# Patient Record
Sex: MALE | Race: WHITE | Employment: OTHER | URBAN - METROPOLITAN AREA
[De-identification: names, ages, dates, MRNs, and addresses within clinical notes are randomized per-mention and may not be internally consistent; named-entity substitution may affect disease eponyms.]

---

## 2017-05-30 ENCOUNTER — HOSPITAL ENCOUNTER (OUTPATIENT)
Dept: GENERAL RADIOLOGY | Age: 57
Discharge: HOME | End: 2017-05-30
Payer: COMMERCIAL

## 2017-05-30 DIAGNOSIS — M51.37 DEGENERATION OF LUMBAR OR LUMBOSACRAL INTERVERTEBRAL DISC: ICD-10-CM

## 2017-05-30 DIAGNOSIS — M51.36 DEGENERATION OF LUMBAR INTERVERTEBRAL DISC: ICD-10-CM

## 2017-05-30 PROCEDURE — 72050 X-RAY EXAM NECK SPINE 4/5VWS: CPT

## 2017-05-30 PROCEDURE — 72110 X-RAY EXAM L-2 SPINE 4/>VWS: CPT

## 2017-12-29 ENCOUNTER — HOSPITAL ENCOUNTER (OUTPATIENT)
Dept: MRI IMAGING | Age: 57
Discharge: HOME | End: 2017-12-29
Attending: PHYSICAL MEDICINE & REHABILITATION
Payer: COMMERCIAL

## 2017-12-29 ENCOUNTER — HOSPITAL ENCOUNTER (OUTPATIENT)
Dept: GENERAL RADIOLOGY | Age: 57
Discharge: HOME | End: 2017-12-29
Attending: PHYSICAL MEDICINE & REHABILITATION
Payer: COMMERCIAL

## 2017-12-29 DIAGNOSIS — M51.37 DDD (DEGENERATIVE DISC DISEASE), LUMBOSACRAL: ICD-10-CM

## 2017-12-29 DIAGNOSIS — M51.37 DEGENERATION OF LUMBAR OR LUMBOSACRAL INTERVERTEBRAL DISC: ICD-10-CM

## 2017-12-29 PROCEDURE — 72100 X-RAY EXAM L-S SPINE 2/3 VWS: CPT

## 2017-12-29 PROCEDURE — 72148 MRI LUMBAR SPINE W/O DYE: CPT

## 2018-01-11 ENCOUNTER — HOSPITAL ENCOUNTER (OUTPATIENT)
Dept: GENERAL RADIOLOGY | Age: 58
Discharge: HOME | End: 2018-01-11
Payer: COMMERCIAL

## 2018-01-11 DIAGNOSIS — M54.9 BACK PAIN: ICD-10-CM

## 2018-01-11 PROCEDURE — 72110 X-RAY EXAM L-2 SPINE 4/>VWS: CPT

## 2020-08-07 RX ORDER — CHLORTHALIDONE 25 MG/1
25 TABLET ORAL DAILY
COMMUNITY
End: 2022-05-31

## 2020-08-07 RX ORDER — METFORMIN HYDROCHLORIDE 500 MG/1
500 TABLET, EXTENDED RELEASE ORAL 2 TIMES DAILY
COMMUNITY

## 2020-08-07 RX ORDER — CYCLOBENZAPRINE HCL 10 MG
10-20 TABLET ORAL AS NEEDED
COMMUNITY

## 2020-08-07 RX ORDER — VALSARTAN 320 MG/1
320 TABLET ORAL DAILY
COMMUNITY
End: 2022-05-31

## 2020-08-07 RX ORDER — DULAGLUTIDE 1.5 MG/.5ML
1.5 INJECTION, SOLUTION SUBCUTANEOUS
COMMUNITY

## 2020-08-15 ENCOUNTER — HOSPITAL ENCOUNTER (OUTPATIENT)
Dept: PREADMISSION TESTING | Age: 60
Discharge: HOME | End: 2020-08-15
Payer: MEDICARE

## 2020-08-15 DIAGNOSIS — Z01.812 PRE-PROCEDURE LAB EXAM: ICD-10-CM

## 2020-08-15 PROCEDURE — 87635 SARS-COV-2 COVID-19 AMP PRB: CPT

## 2020-08-16 LAB — SARS-COV-2, COV2NT: NOT DETECTED

## 2020-08-19 ENCOUNTER — ANESTHESIA EVENT (OUTPATIENT)
Dept: ENDOSCOPY | Age: 60
End: 2020-08-19
Payer: MEDICARE

## 2020-08-19 ENCOUNTER — HOSPITAL ENCOUNTER (OUTPATIENT)
Age: 60
Setting detail: OUTPATIENT SURGERY
Discharge: HOME | End: 2020-08-19
Attending: INTERNAL MEDICINE | Admitting: INTERNAL MEDICINE
Payer: MEDICARE

## 2020-08-19 ENCOUNTER — ANESTHESIA (OUTPATIENT)
Dept: ENDOSCOPY | Age: 60
End: 2020-08-19
Payer: MEDICARE

## 2020-08-19 VITALS
TEMPERATURE: 97.7 F | RESPIRATION RATE: 11 BRPM | WEIGHT: 236 LBS | HEIGHT: 72 IN | SYSTOLIC BLOOD PRESSURE: 127 MMHG | HEART RATE: 65 BPM | BODY MASS INDEX: 31.97 KG/M2 | OXYGEN SATURATION: 99 % | DIASTOLIC BLOOD PRESSURE: 70 MMHG

## 2020-08-19 LAB
GLUCOSE BLD STRIP.AUTO-MCNC: 113 MG/DL (ref 65–100)
SERVICE CMNT-IMP: ABNORMAL

## 2020-08-19 PROCEDURE — 74011000250 HC RX REV CODE- 250: Performed by: NURSE ANESTHETIST, CERTIFIED REGISTERED

## 2020-08-19 PROCEDURE — 76040000019: Performed by: INTERNAL MEDICINE

## 2020-08-19 PROCEDURE — 74011250636 HC RX REV CODE- 250/636: Performed by: NURSE ANESTHETIST, CERTIFIED REGISTERED

## 2020-08-19 PROCEDURE — 76060000031 HC ANESTHESIA FIRST 0.5 HR: Performed by: INTERNAL MEDICINE

## 2020-08-19 PROCEDURE — 74011250637 HC RX REV CODE- 250/637: Performed by: INTERNAL MEDICINE

## 2020-08-19 PROCEDURE — 82962 GLUCOSE BLOOD TEST: CPT

## 2020-08-19 RX ORDER — DEXTROMETHORPHAN/PSEUDOEPHED 2.5-7.5/.8
1.2 DROPS ORAL
Status: DISCONTINUED | OUTPATIENT
Start: 2020-08-19 | End: 2020-08-19 | Stop reason: HOSPADM

## 2020-08-19 RX ORDER — PROPOFOL 10 MG/ML
INJECTION, EMULSION INTRAVENOUS AS NEEDED
Status: DISCONTINUED | OUTPATIENT
Start: 2020-08-19 | End: 2020-08-19 | Stop reason: HOSPADM

## 2020-08-19 RX ORDER — NALOXONE HYDROCHLORIDE 0.4 MG/ML
0.4 INJECTION, SOLUTION INTRAMUSCULAR; INTRAVENOUS; SUBCUTANEOUS
Status: DISCONTINUED | OUTPATIENT
Start: 2020-08-19 | End: 2020-08-19 | Stop reason: HOSPADM

## 2020-08-19 RX ORDER — SODIUM CHLORIDE 0.9 % (FLUSH) 0.9 %
5-40 SYRINGE (ML) INJECTION EVERY 8 HOURS
Status: DISCONTINUED | OUTPATIENT
Start: 2020-08-19 | End: 2020-08-19 | Stop reason: HOSPADM

## 2020-08-19 RX ORDER — EPINEPHRINE 0.1 MG/ML
1 INJECTION INTRACARDIAC; INTRAVENOUS
Status: DISCONTINUED | OUTPATIENT
Start: 2020-08-19 | End: 2020-08-19 | Stop reason: HOSPADM

## 2020-08-19 RX ORDER — SODIUM CHLORIDE 9 MG/ML
INJECTION, SOLUTION INTRAVENOUS
Status: DISCONTINUED | OUTPATIENT
Start: 2020-08-19 | End: 2020-08-19 | Stop reason: HOSPADM

## 2020-08-19 RX ORDER — SODIUM CHLORIDE 9 MG/ML
50 INJECTION, SOLUTION INTRAVENOUS CONTINUOUS
Status: DISCONTINUED | OUTPATIENT
Start: 2020-08-19 | End: 2020-08-19 | Stop reason: HOSPADM

## 2020-08-19 RX ORDER — SODIUM CHLORIDE 0.9 % (FLUSH) 0.9 %
5-40 SYRINGE (ML) INJECTION AS NEEDED
Status: DISCONTINUED | OUTPATIENT
Start: 2020-08-19 | End: 2020-08-19 | Stop reason: HOSPADM

## 2020-08-19 RX ORDER — LIDOCAINE HYDROCHLORIDE 20 MG/ML
INJECTION, SOLUTION EPIDURAL; INFILTRATION; INTRACAUDAL; PERINEURAL AS NEEDED
Status: DISCONTINUED | OUTPATIENT
Start: 2020-08-19 | End: 2020-08-19 | Stop reason: HOSPADM

## 2020-08-19 RX ORDER — FLUMAZENIL 0.1 MG/ML
0.2 INJECTION INTRAVENOUS
Status: DISCONTINUED | OUTPATIENT
Start: 2020-08-19 | End: 2020-08-19 | Stop reason: HOSPADM

## 2020-08-19 RX ORDER — ATROPINE SULFATE 0.1 MG/ML
0.5 INJECTION INTRAVENOUS
Status: DISCONTINUED | OUTPATIENT
Start: 2020-08-19 | End: 2020-08-19 | Stop reason: HOSPADM

## 2020-08-19 RX ADMIN — SODIUM CHLORIDE: 900 INJECTION, SOLUTION INTRAVENOUS at 10:44

## 2020-08-19 RX ADMIN — PROPOFOL 50 MG: 10 INJECTION, EMULSION INTRAVENOUS at 10:44

## 2020-08-19 RX ADMIN — PROPOFOL 10 MG: 10 INJECTION, EMULSION INTRAVENOUS at 10:51

## 2020-08-19 RX ADMIN — PROPOFOL 10 MG: 10 INJECTION, EMULSION INTRAVENOUS at 10:57

## 2020-08-19 RX ADMIN — LIDOCAINE HYDROCHLORIDE 40 MG: 20 INJECTION, SOLUTION EPIDURAL; INFILTRATION; INTRACAUDAL; PERINEURAL at 10:44

## 2020-08-19 RX ADMIN — PROPOFOL 10 MG: 10 INJECTION, EMULSION INTRAVENOUS at 10:49

## 2020-08-19 RX ADMIN — PROPOFOL 10 MG: 10 INJECTION, EMULSION INTRAVENOUS at 10:50

## 2020-08-19 RX ADMIN — PROPOFOL 70 MG: 10 INJECTION, EMULSION INTRAVENOUS at 10:47

## 2020-08-19 RX ADMIN — PROPOFOL 10 MG: 10 INJECTION, EMULSION INTRAVENOUS at 10:52

## 2020-08-19 RX ADMIN — PROPOFOL 20 MG: 10 INJECTION, EMULSION INTRAVENOUS at 10:48

## 2020-08-19 RX ADMIN — PROPOFOL 10 MG: 10 INJECTION, EMULSION INTRAVENOUS at 10:55

## 2020-08-19 NOTE — ROUTINE PROCESS
Elmira Millard  1960  [id number]    Situation:  Verbal report received from:   Procedure: Procedure(s):  COLONOSCOPY    :-    Background:    Preoperative diagnosis: ABDOMINAL PAIN  Postoperative diagnosis: Mild Diverticulosis    :  Dr. Zunilda David  Assistant(s): Endoscopy Technician-1: Rachael Pelletier  Endoscopy RN-1: Fer Aguiar RN    Specimens: no  H. Pylori no    Assessment:  Intra-procedure medications     Anesthesia gave intra-procedure sedation and medications, see anesthesia flow sheet     Intravenous fluids: NS@ KVO     Vital signs stable    Abdominal assessment: round and soft     Recommendation:  Discharge patient per MD order  Family  Permission to share finding with family .

## 2020-08-19 NOTE — H&P
[address], [address] [place] [address]                 Massimo Bailey is a  61 y.o.  male who presents with post prandial abdominal pain and cramps with some loose stools .         Past Medical History:   Diagnosis Date    Adverse effect of anesthesia     woke up during colonoscopy and felt pressure    Arthritis     Chronic kidney disease     has one kidney; born with only one    Chronic pain     back    Diabetes ([address]     Hypertension     Ill-defined condition 2013    [place] palsy    Sleep apnea     has CPAP, doesn't use it/uses it per pt on 8/7/2020    Sleep disorder     sleep apnea     Past Surgical History:   Procedure Laterality Date    ABDOMEN SURGERY PROC UNLISTED      inguinal hernia repair as a child    ABDOMEN SURGERY PROC UNLISTED  2/10/16    Laparoscopic cholecystectomy with cholangiogram, interpretation of cholangiogram     HX CHOLECYSTECTOMY  02/10/2016    Laparoscopic Cholecystectomy    HX ORTHOPAEDIC      wrist cyst as teenager    HX UROLOGICAL  2008    lap sx to remove remnants of kidney (bladder knicked during this surgery and had an ileus)/second lap surgery on kidney to remove the rest of the remnants     No Known Allergies  Current Facility-Administered Medications   Medication Dose Route Frequency Provider Last Rate Last Dose    0.9% sodium chloride infusion  50 mL/hr IntraVENous CONTINUOUS Roland Mccarty MD        sodium chloride (NS) flush 5-40 mL  5-40 mL IntraVENous Q8H Roland Mccarty MD        sodium chloride (NS) flush 5-40 mL  5-40 mL IntraVENous PRN Roland Mccarty MD        naloxone [facility]) injection 0.4 mg  0.4 mg IntraVENous Multiple Roland MD Bibiana        flumazeniL (ROMAZICON) 0.1 mg/mL injection 0.2 mg  0.2 mg IntraVENous Multiple Roland Mccarty MD        simethicone (MYLICON) 18PU/9.3AL oral drops 80 mg  1.2 mL Oral Multiple Roland MD Bibiana        atropine injection 0.5 mg  0.5 mg IntraVENous ONCE PRN Deanna Aviles MD        EPINEPHrine (ADRENALIN) 0.1 mg/mL syringe 1 mg  1 mg Endoscopically ONCE PRN Deanna Aviles MD           Visit Vitals  /82   Pulse 67   Temp 97.8 °F (36.6 °C)   Resp 16   Ht 6' (1.829 m)   Wt 107 kg (236 lb)   SpO2 99%   BMI 32.01 kg/m²       The patient was counseled at length about the risks of taylor Covid-19 in the celia-operative and post-operative states including the recovery window of their procedure. The patient was made aware that taylor Covid-19 after a surgical procedure may worsen their prognosis for recovering from the virus and lend to a higher morbidity and or mortality risk. The patient was given the options of postponing their procedure. All of the risks, benefits, and alternatives were discussed. The patient does  wish to proceed with the procedure. PHYSICAL EXAM:  General: WD, WN. Alert, cooperative, no acute distress    HEENT: NC, Atraumatic. PERRLA, EOMI. Anicteric sclerae. Mallampati score 2  Lungs:  CTA Bilaterally. No Wheezing/Rhonchi/Rales. Heart:  Regular  rhythm,  No murmur (), No Rubs, No Gallops  Abdomen: Soft, Non distended, Non tender. +Bowel sounds, no HSM  Extremities: No c/c/e  Neurologic:  CN 2-12 gi, Alert and oriented X 3. No acute neurological distress   Psych:   Good insight. Not anxious nor agitated. Plan:   Endoscopic procedure with MAC.     Raissa Dasilva MD  8/19/2020  10:34 AM

## 2020-08-19 NOTE — ANESTHESIA PREPROCEDURE EVALUATION
Relevant Problems   No relevant active problems       Anesthetic History   No history of anesthetic complications            Review of Systems / Medical History  Patient summary reviewed, nursing notes reviewed and pertinent labs reviewed    Pulmonary        Sleep apnea: CPAP           Neuro/Psych         TIA     Cardiovascular  Within defined limits  Hypertension: well controlled              Exercise tolerance: >4 METS     GI/Hepatic/Renal  Within defined limits             Comments: Charles single kidney Endo/Other    Diabetes: type 2, using insulin    Obesity and arthritis     Other Findings            Physical Exam    Airway  Mallampati: III  TM Distance: > 6 cm  Neck ROM: normal range of motion   Mouth opening: Normal     Cardiovascular  Regular rate and rhythm,  S1 and S2 normal,  no murmur, click, rub, or gallop             Dental  No notable dental hx       Pulmonary  Breath sounds clear to auscultation               Abdominal  GI exam deferred       Other Findings            Anesthetic Plan    ASA: 3  Anesthesia type: MAC          Induction: Intravenous  Anesthetic plan and risks discussed with: Patient

## 2020-08-19 NOTE — PROGRESS NOTES

## 2020-08-19 NOTE — DISCHARGE INSTRUCTIONS
26[address] [facility]  [address]          Duane Amezquita  [id number]  1960    COLON DISCHARGE INSTRUCTIONS    DISCOMFORT:  Redness at IV site- apply warm compress to area; if redness or soreness persist- contact your physician  There may be a slight amount of blood passed from the rectum  Gaseous discomfort- walking, belching will help relieve any discomfort  You may not operate a vehicle for 12 hours  You may not engage in an occupation involving machinery or appliances for rest of today  You may not drink alcoholic beverages for at least 12 hours  Avoid making any critical decisions for at least 24 hour  DIET:   Regular diet. - however -  remember your colon is empty and a heavy meal will produce gas. Avoid these foods:  vegetables, fried / greasy foods, carbonated drinks for today         ACTIVITY:  You may resume your normal daily activities it is recommended that you spend the remainder of the day resting -  avoid any strenuous activity. CALL M.D. ANY SIGN OF:   Increasing pain, nausea, vomiting  Abdominal distension (swelling)  New increased bleeding (oral or rectal)  Fever (chills)  Pain in chest area  Bloody discharge from nose or mouth  Shortness of breath     Follow-up Instructions:   Call Dr. Alexandria Brock for any questions or problems. Telephone # [phone]  Should have a repeat colonoscopy in 10 years    Impression:  Mild Diverticulosis      Learning About Coronavirus (COVID-19)  Coronavirus [phone]): Overview  What is coronavirus (COVID-19)? The coronavirus disease (COVID-19) is caused by a virus. It is an illness that was first found in [place], [place], in December 2019. It has since spread worldwide. The virus can cause fever, cough, and trouble breathing. In severe cases, it can cause pneumonia and make it hard to breathe without help. It can cause death. Coronaviruses are a large group of viruses. They cause the common cold.  They also cause more serious illnesses like Middle East respiratory syndrome (MERS) and severe acute respiratory syndrome (SARS). COVID-19 is caused by a novel coronavirus. That means it's a new type that has not been seen in people before. This virus spreads person-to-person through droplets from coughing and sneezing. It can also spread when you are close to someone who is infected. And it can spread when you touch something that has the virus on it, such as a doorknob or a tabletop. What can you do to protect yourself from coronavirus (COVID-19)? The best way to protect yourself from getting sick is to:  · Avoid areas where there is an outbreak. · Avoid contact with people who may be infected. · Wash your hands often with soap or alcohol-based hand sanitizers. · Avoid crowds and try to stay at least 6 feet away from other people. · Wash your hands often, especially after you cough or sneeze. Use soap and water, and scrub for at least 20 seconds. If soap and water aren't available, use an alcohol-based hand . · Avoid touching your mouth, nose, and eyes. What can you do to avoid spreading the virus to others? To help avoid spreading the virus to others:  · Cover your mouth with a tissue when you cough or sneeze. Then throw the tissue in the trash. · Use a disinfectant to clean things that you touch often. · Stay home if you are sick or have been exposed to the virus. Don't go to school, work, or public areas. And don't use public transportation. · If you are sick:  ? Leave your home only if you need to get medical care. But call the doctor's office first so they know you're coming. And wear a face mask, if you have one.  ? If you have a face mask, wear it whenever you're around other people. It can help stop the spread of the virus when you cough or sneeze. ? Clean and disinfect your home every day. Use household  and disinfectant wipes or sprays.  Take special care to clean things that you grab with your hands. These include doorknobs, remote controls, phones, and handles on your refrigerator and microwave. And don't forget countertops, tabletops, bathrooms, and computer keyboards. When to call for help  Call 911 anytime you think you may need emergency care. For example, call if:  · You have severe trouble breathing. (You can't talk at all.)  · You have constant chest pain or pressure. · You are severely dizzy or lightheaded. · You are confused or can't think clearly. · Your face and lips have a blue color. · You pass out (lose consciousness) or are very hard to wake up. Call your doctor now if you develop symptoms such as:  · Shortness of breath. · Fever. · Cough. If you need to get care, call ahead to the doctor's office for instructions before you go. Make sure you wear a face mask, if you have one, to prevent exposing other people to the virus. Where can you get the latest information? The following health organizations are tracking and studying this virus. Their websites contain the most up-to-date information. Gretchen Don also learn what to do if you think you may have been exposed to the virus. · U.S. Centers for Disease Control and Prevention (CDC): The CDC provides updated news about the disease and travel advice. The website also tells you how to prevent the spread of infection. www.cdc.gov  · World Health Organization [facility]): WHO offers information about the virus outbreaks. WHO also has travel advice. www.who.int  Current as of: April 1, 2020               Content Version: 12.4  © 2006-2020 Healthwise, Incorporated. Care instructions adapted under license by your healthcare professional. If you have questions about a medical condition or this instruction, always ask your healthcare professional. [address] any warranty or liability for your use of this information.

## 2020-08-19 NOTE — PROCEDURES
[address]  [address]         Procedure:  Colonoscopy    :  Luis Manuel Grider MD    Surgical Assistant: None    Implants: None    Referring Provider: Jacqueline Gerardo MD    Sedation:  MAC anesthesia Propofol      Prior to the procedure its objectives, risks, consequences and alternatives were discussed with the patient who then elected to proceed. The patient had the opportunity to ask questions and those questions were answered. A physical exam was performed. The heart, lungs, and mental status were examined prior to the procedure and found to be satisfactory for conscious sedation and for the procedure. Conscious sedation was initiated by the physician. Continuous pulse oximetry and blood pressure monitoring were used throughout the procedure. After appropriate analgesia and rectal exam, the colonoscope was passed into the anus and passed to the cecum without difficulty. The prep was good. On slow withdrawal of the scope, the cecum, ascending, colon, hepatic flexure, transverse colon, splenic flexure and descending colon were normal. In the sigmoid there were mild diverticulosis. The rectum was normal. Retroflexion exam of the rectum was normal He tolerated the procedure without complication and I recommend a repeat colonoscopy in 10 years. Specimen Removed:  None    Complications: None. EBL:  None.         Luis Manuel Grider MD  8/19/2020  11:03 AM

## 2020-11-01 ENCOUNTER — TRANSCRIBE ORDER (OUTPATIENT)
Dept: REGISTRATION | Age: 60
End: 2020-11-01

## 2020-11-01 ENCOUNTER — HOSPITAL ENCOUNTER (OUTPATIENT)
Dept: PREADMISSION TESTING | Age: 60
Discharge: HOME | End: 2020-11-01
Payer: MEDICARE

## 2020-11-01 DIAGNOSIS — Z01.812 PRE-PROCEDURE LAB EXAM: Primary | ICD-10-CM

## 2020-11-01 DIAGNOSIS — Z01.812 PRE-PROCEDURE LAB EXAM: ICD-10-CM

## 2020-11-01 PROCEDURE — 87635 SARS-COV-2 COVID-19 AMP PRB: CPT

## 2020-11-02 LAB — SARS-COV-2, COV2NT: NOT DETECTED

## 2020-11-05 ENCOUNTER — HOSPITAL ENCOUNTER (OUTPATIENT)
Age: 60
Setting detail: OUTPATIENT SURGERY
Discharge: HOME | End: 2020-11-05
Attending: INTERNAL MEDICINE | Admitting: INTERNAL MEDICINE
Payer: MEDICARE

## 2020-11-05 ENCOUNTER — ANESTHESIA EVENT (OUTPATIENT)
Dept: ENDOSCOPY | Age: 60
End: 2020-11-05
Payer: MEDICARE

## 2020-11-05 ENCOUNTER — ANESTHESIA (OUTPATIENT)
Dept: ENDOSCOPY | Age: 60
End: 2020-11-05
Payer: MEDICARE

## 2020-11-05 VITALS
SYSTOLIC BLOOD PRESSURE: 122 MMHG | HEART RATE: 78 BPM | BODY MASS INDEX: 32.91 KG/M2 | DIASTOLIC BLOOD PRESSURE: 72 MMHG | HEIGHT: 72 IN | RESPIRATION RATE: 11 BRPM | WEIGHT: 243 LBS | TEMPERATURE: 98.3 F | OXYGEN SATURATION: 96 %

## 2020-11-05 LAB
GLUCOSE BLD STRIP.AUTO-MCNC: 134 MG/DL (ref 65–100)
SERVICE CMNT-IMP: ABNORMAL

## 2020-11-05 PROCEDURE — 76060000031 HC ANESTHESIA FIRST 0.5 HR: Performed by: INTERNAL MEDICINE

## 2020-11-05 PROCEDURE — 74011250636 HC RX REV CODE- 250/636: Performed by: NURSE ANESTHETIST, CERTIFIED REGISTERED

## 2020-11-05 PROCEDURE — 74011000250 HC RX REV CODE- 250: Performed by: NURSE ANESTHETIST, CERTIFIED REGISTERED

## 2020-11-05 PROCEDURE — 77030021593 HC FCPS BIOP ENDOSC BSC -A: Performed by: INTERNAL MEDICINE

## 2020-11-05 PROCEDURE — 2709999900 HC NON-CHARGEABLE SUPPLY: Performed by: INTERNAL MEDICINE

## 2020-11-05 PROCEDURE — 82962 GLUCOSE BLOOD TEST: CPT

## 2020-11-05 PROCEDURE — 74011250636 HC RX REV CODE- 250/636: Performed by: INTERNAL MEDICINE

## 2020-11-05 PROCEDURE — 88305 TISSUE EXAM BY PATHOLOGIST: CPT

## 2020-11-05 PROCEDURE — 76040000019: Performed by: INTERNAL MEDICINE

## 2020-11-05 RX ORDER — SODIUM CHLORIDE 0.9 % (FLUSH) 0.9 %
5-40 SYRINGE (ML) INJECTION EVERY 8 HOURS
Status: DISCONTINUED | OUTPATIENT
Start: 2020-11-05 | End: 2020-11-05 | Stop reason: HOSPADM

## 2020-11-05 RX ORDER — DEXTROMETHORPHAN/PSEUDOEPHED 2.5-7.5/.8
1.2 DROPS ORAL
Status: DISCONTINUED | OUTPATIENT
Start: 2020-11-05 | End: 2020-11-05 | Stop reason: HOSPADM

## 2020-11-05 RX ORDER — NALOXONE HYDROCHLORIDE 0.4 MG/ML
0.4 INJECTION, SOLUTION INTRAMUSCULAR; INTRAVENOUS; SUBCUTANEOUS
Status: DISCONTINUED | OUTPATIENT
Start: 2020-11-05 | End: 2020-11-05 | Stop reason: HOSPADM

## 2020-11-05 RX ORDER — LIDOCAINE HYDROCHLORIDE 20 MG/ML
INJECTION, SOLUTION EPIDURAL; INFILTRATION; INTRACAUDAL; PERINEURAL AS NEEDED
Status: DISCONTINUED | OUTPATIENT
Start: 2020-11-05 | End: 2020-11-05 | Stop reason: HOSPADM

## 2020-11-05 RX ORDER — SODIUM CHLORIDE 9 MG/ML
50 INJECTION, SOLUTION INTRAVENOUS CONTINUOUS
Status: DISCONTINUED | OUTPATIENT
Start: 2020-11-05 | End: 2020-11-05 | Stop reason: HOSPADM

## 2020-11-05 RX ORDER — MIDAZOLAM HYDROCHLORIDE 1 MG/ML
.25-5 INJECTION, SOLUTION INTRAMUSCULAR; INTRAVENOUS
Status: DISCONTINUED | OUTPATIENT
Start: 2020-11-05 | End: 2020-11-05 | Stop reason: HOSPADM

## 2020-11-05 RX ORDER — FENTANYL CITRATE 50 UG/ML
25-200 INJECTION, SOLUTION INTRAMUSCULAR; INTRAVENOUS
Status: DISCONTINUED | OUTPATIENT
Start: 2020-11-05 | End: 2020-11-05 | Stop reason: HOSPADM

## 2020-11-05 RX ORDER — ATROPINE SULFATE 0.1 MG/ML
0.5 INJECTION INTRAVENOUS
Status: DISCONTINUED | OUTPATIENT
Start: 2020-11-05 | End: 2020-11-05 | Stop reason: HOSPADM

## 2020-11-05 RX ORDER — PROPOFOL 10 MG/ML
INJECTION, EMULSION INTRAVENOUS AS NEEDED
Status: DISCONTINUED | OUTPATIENT
Start: 2020-11-05 | End: 2020-11-05 | Stop reason: HOSPADM

## 2020-11-05 RX ORDER — EPINEPHRINE 0.1 MG/ML
1 INJECTION INTRACARDIAC; INTRAVENOUS
Status: DISCONTINUED | OUTPATIENT
Start: 2020-11-05 | End: 2020-11-05 | Stop reason: HOSPADM

## 2020-11-05 RX ORDER — SODIUM CHLORIDE 0.9 % (FLUSH) 0.9 %
5-40 SYRINGE (ML) INJECTION AS NEEDED
Status: DISCONTINUED | OUTPATIENT
Start: 2020-11-05 | End: 2020-11-05 | Stop reason: HOSPADM

## 2020-11-05 RX ORDER — PANTOPRAZOLE SODIUM 20 MG/1
20 TABLET, DELAYED RELEASE ORAL DAILY
Qty: 30 TAB | Refills: 1 | Status: SHIPPED | OUTPATIENT
Start: 2020-11-05 | End: 2020-12-05

## 2020-11-05 RX ORDER — FLUMAZENIL 0.1 MG/ML
0.2 INJECTION INTRAVENOUS
Status: DISCONTINUED | OUTPATIENT
Start: 2020-11-05 | End: 2020-11-05 | Stop reason: HOSPADM

## 2020-11-05 RX ORDER — SODIUM CHLORIDE 9 MG/ML
INJECTION, SOLUTION INTRAVENOUS
Status: DISCONTINUED | OUTPATIENT
Start: 2020-11-05 | End: 2020-11-05 | Stop reason: HOSPADM

## 2020-11-05 RX ADMIN — PROPOFOL 30 MG: 10 INJECTION, EMULSION INTRAVENOUS at 12:17

## 2020-11-05 RX ADMIN — PROPOFOL 100 MG: 10 INJECTION, EMULSION INTRAVENOUS at 12:12

## 2020-11-05 RX ADMIN — PROPOFOL 50 MG: 10 INJECTION, EMULSION INTRAVENOUS at 12:13

## 2020-11-05 RX ADMIN — PROPOFOL 50 MG: 10 INJECTION, EMULSION INTRAVENOUS at 12:14

## 2020-11-05 RX ADMIN — SODIUM CHLORIDE: 900 INJECTION, SOLUTION INTRAVENOUS at 12:08

## 2020-11-05 RX ADMIN — LIDOCAINE HYDROCHLORIDE 100 MG: 20 INJECTION, SOLUTION EPIDURAL; INFILTRATION; INTRACAUDAL; PERINEURAL at 12:12

## 2020-11-05 NOTE — DISCHARGE INSTRUCTIONS
[address]  174 [facility], [address] [place] [address]    EGD DISCHARGE INSTRUCTIONS    Shari Alonzo  [id number]  1960    Discomfort:  Sore throat- throat lozenges or warm salt water gargle  redness at IV site- apply warm compress to area; if redness or soreness persist- contact your physician  Gaseous discomfort- walking, belching will help relieve any discomfort  You may not operate a vehicle for 12 hours  You may not engage in an occupation involving machinery or appliances for rest of today  You may not drink alcoholic beverages for at least 12 hours  Avoid making any critical decisions for at least 24 hour  DIET  You may resume your regular diet - however -  remember your colon is empty and a heavy meal will produce gas. Avoid these foods:  vegetables, fried / greasy foods, carbonated drinks    ACTIVITY  You may resume your normal daily activities   Spend the remainder of the day resting -  avoid any strenuous activity. CALL M.D. ANY SIGN OF   Increasing pain, nausea, vomiting  Abdominal distension (swelling)  New increased bleeding (oral or rectal)  Fever (chills)  Pain in chest area  Bloody discharge from nose or mouth  Shortness of breath    Follow-up Instructions:   Call Dr. Deja Huston for any questions or problems. Telephone # [phone]    ENDOSCOPY FINDINGS:   Your endoscopy showed gastric erosions. Biopsies were taken. We will contact you about the results. A prescription for pantoprazole has been sent to your pharmacy. Please avoid NSAID's, alcohol, and tobacco products. Signed By: Frances Carson. Natalya Paez MD     11/5/2020  12:26 PM         Learning About Coronavirus ([id number])  Coronavirus (COVID-19): Overview  What is coronavirus (ABINK-11)? The coronavirus disease (COVID-19) is caused by a virus. It is an illness that was first found in [place], [place], in December 2019. It has since spread worldwide. The virus can cause fever, cough, and trouble breathing.  In severe cases, it can cause pneumonia and make it hard to breathe without help. It can cause death. Coronaviruses are a large group of viruses. They cause the common cold. They also cause more serious illnesses like [place] East respiratory syndrome (MERS) and severe acute respiratory syndrome (SARS). COVID-19 is caused by a novel coronavirus. That means it's a new type that has not been seen in people before. This virus spreads person-to-person through droplets from coughing and sneezing. It can also spread when you are close to someone who is infected. And it can spread when you touch something that has the virus on it, such as a doorknob or a tabletop. What can you do to protect yourself from coronavirus (COVID-19)? The best way to protect yourself from getting sick is to:  · Avoid areas where there is an outbreak. · Avoid contact with people who may be infected. · Wash your hands often with soap or alcohol-based hand sanitizers. · Avoid crowds and try to stay at least 6 feet away from other people. · Wash your hands often, especially after you cough or sneeze. Use soap and water, and scrub for at least 20 seconds. If soap and water aren't available, use an alcohol-based hand . · Avoid touching your mouth, nose, and eyes. What can you do to avoid spreading the virus to others? To help avoid spreading the virus to others:  · Cover your mouth with a tissue when you cough or sneeze. Then throw the tissue in the trash. · Use a disinfectant to clean things that you touch often. · Stay home if you are sick or have been exposed to the virus. Don't go to school, work, or public areas. And don't use public transportation. · If you are sick:  ? Leave your home only if you need to get medical care. But call the doctor's office first so they know you're coming. And wear a face mask, if you have one.  ? If you have a face mask, wear it whenever you're around other people.  It can help stop the spread of the virus when you cough or sneeze. ? Clean and disinfect your home every day. Use household  and disinfectant wipes or sprays. Take special care to clean things that you grab with your hands. These include doorknobs, remote controls, phones, and handles on your refrigerator and microwave. And don't forget countertops, tabletops, bathrooms, and computer keyboards. When to call for help  Call 911 anytime you think you may need emergency care. For example, call if:  · You have severe trouble breathing. (You can't talk at all.)  · You have constant chest pain or pressure. · You are severely dizzy or lightheaded. · You are confused or can't think clearly. · Your face and lips have a blue color. · You pass out (lose consciousness) or are very hard to wake up. Call your doctor now if you develop symptoms such as:  · Shortness of breath. · Fever. · Cough. If you need to get care, call ahead to the doctor's office for instructions before you go. Make sure you wear a face mask, if you have one, to prevent exposing other people to the virus. Where can you get the latest information? The following health organizations are tracking and studying this virus. Their websites contain the most up-to-date information. Eliane Mcdowell also learn what to do if you think you may have been exposed to the virus. · U.S. Centers for Disease Control and Prevention (CDC): The CDC provides updated news about the disease and travel advice. The website also tells you how to prevent the spread of infection. www.cdc.gov  · World Health Organization [facility]): WHO offers information about the virus outbreaks. WHO also has travel advice. www.who.int  Current as of: April 1, 2020               Content Version: 12.4  © 6463-4277 Healthwise, Incorporated.    Care instructions adapted under license by your healthcare professional. If you have questions about a medical condition or this instruction, always ask your healthcare professional. [employer] disclaims any warranty or liability for your use of this information.

## 2020-11-05 NOTE — ANESTHESIA POSTPROCEDURE EVALUATION
Post-Anesthesia Evaluation and Assessment    Patient: Shari Alonzo MRN: [id number]  SSN: [id number]    YOB: 1960  Age: 61 y.o. Sex: male      I have evaluated the patient and they are stable and ready for discharge from the PACU. Cardiovascular Function/Vital Signs  Visit Vitals  BP (!) 92/53   Pulse 75   Temp 36.8 °C (98.3 °F)   Resp 14   Ht 6' (1.829 m)   Wt 110.2 kg (243 lb)   SpO2 95%   BMI 32.96 kg/m²       Patient is status post MAC anesthesia for Procedure(s):  ESOPHAGOGASTRODUODENOSCOPY (EGD)   :-  ESOPHAGOGASTRODUODENAL (EGD) BIOPSY. Nausea/Vomiting: None    Postoperative hydration reviewed and adequate. Pain:  Pain Scale 1: Numeric (0 - 10) (11/05/20 1229)  Pain Intensity 1: 0 (11/05/20 1229)   Managed    Neurological Status: At baseline    Mental Status, Level of Consciousness: Alert and  oriented to person, place, and time    Pulmonary Status:   O2 Device: Room air (11/05/20 1229)   Adequate oxygenation and airway patent    Complications related to anesthesia: None    Post-anesthesia assessment completed. No concerns    Signed By: Caio Whitehead MD     November 5, 2020              Procedure(s):  ESOPHAGOGASTRODUODENOSCOPY (EGD)   :-  ESOPHAGOGASTRODUODENAL (EGD) BIOPSY. MAC    <BSHSIANPOST>    INITIAL Post-op Vital signs:   Vitals Value Taken Time   BP 92/53 11/5/2020 12:29 PM   Temp 36.8 °C (98.3 °F) 11/5/2020 12:29 PM   Pulse 75 11/5/2020 12:32 PM   Resp 14 11/5/2020 12:32 PM   SpO2 95 % 11/5/2020 12:32 PM   Vitals shown include unvalidated device data.

## 2020-11-05 NOTE — PROCEDURES
[address]  174 [facility], [facility] (EGD) Procedure Note    Valeria Boyce  1960  [id number]      Procedure: Endoscopic Gastroduodenoscopy --diagnostic, with biopsy    Indication:  generalized abdominal pain, belching     Pre-operative Diagnosis: see indication above    Post-operative Diagnosis: see findings below    : Tirso Meza MD    Referring Provider:  Janelle Pena MD      Anesthesia/Sedation:  MAC anesthesia Propofol        Procedure Details     After informed consent was obtained for the procedure, with all risks and benefits of procedure explained the patient was taken to the endoscopy suite and placed in the left lateral decubitus position. Following sequential administration of sedation as per above, the endoscope was inserted into the mouth and advanced under direct vision to second portion of the duodenum. A careful inspection was made as the gastroscope was withdrawn, including a retroflexed view of the proximal stomach; findings and interventions are described below. Findings:   Esophagus:normal  Stomach: few 2-3 mm gastric antral erosions - cold biopsied. Duodenum: normal to second portion - cold biopsied. Therapies:  As above    Specimens: 1. Duodenum, 2. Gastric         EBL: None      Complications:   None; patient tolerated the procedure well. Impression:    Gastric erosions    Recommendations:  1. Follow up surgical pathology  2. Treat for H pylori, if positive  3. Avoid non-essential NSAID's, alcohol, and tobacco products  4. Script for pantoprazole sent to pharmacy  5. Follow up in 2 months    Signed By: Tirso Meza MD     11/5/2020  12:27 PM

## 2020-11-05 NOTE — ROUTINE PROCESS
Nathaniel Daija 1960 
[id number] Situation: 
Verbal report received from: Soraidalien Davis Procedure: Procedure(s): ESOPHAGOGASTRODUODENOSCOPY (EGD)   :- 
ESOPHAGOGASTRODUODENAL (EGD) BIOPSY Background: 
 
Preoperative diagnosis: ABDOMINAL PAIN Postoperative diagnosis: 1)gastric erosions :  Dr. Elieser García Assistant(s): Endoscopy Technician-1: Nataliia Mckenna Endoscopy Technician-Relief: Nataliia Mckenna Endoscopy RN-1: Yvonne Levin RN Specimens:  
ID Type Source Tests Collected by Time Destination 1 : duodenal bx Preservative Duodenum  Madelyn Jacobs MD 11/5/2020 1216 Pathology 2 : gastric bx Preservative Gastric  Madelyn Jacobs MD 11/5/2020 1216 Pathology H. Pylori  no Assessment: 
Intra-procedure medications Anesthesia gave intra-procedure sedation and medications, see anesthesia flow sheet yes Intravenous fluids: NS@ [facility] Vital signs stable Abdominal assessment: round and soft Recommendation: 
Discharge patient per MD order. Family or Friend Spouse Clydia Epley Permission to share finding with family or friend yes

## 2020-11-05 NOTE — PERIOP NOTES

## 2020-11-05 NOTE — H&P
[address]  174 [facility], [address] [place] [address]      History and Physical       NAME:  Kirit Cardoza   :   1960   MRN:   [id number]             History of Present Illness:  Patient is a 61 y.o. who is seen for generalized abdominal pain, belching. PMH:  Past Medical History:   Diagnosis Date    Adverse effect of anesthesia     woke up during colonoscopy and felt pressure    Arthritis     Chronic kidney disease     has one kidney; born with only one    Chronic pain     back    Diabetes ([address]     Hypertension     Ill-defined condition     [place] palsy    Sleep apnea     has CPAP, doesn't use it/uses it per pt on 2020    Sleep disorder     sleep apnea       PSH:  Past Surgical History:   Procedure Laterality Date    ABDOMEN SURGERY PROC UNLISTED      inguinal hernia repair as a child    ABDOMEN SURGERY PROC UNLISTED  2/10/16    Laparoscopic cholecystectomy with cholangiogram, interpretation of cholangiogram     COLONOSCOPY N/A 2020    COLONOSCOPY    :- performed by River Ingram MD at [address] HX CHOLECYSTECTOMY  02/10/2016    Laparoscopic Cholecystectomy    HX ORTHOPAEDIC      wrist cyst as teenager    HX UROLOGICAL      lap sx to remove remnants of kidney (bladder knicked during this surgery and had an ileus)/second lap surgery on kidney to remove the rest of the remnants       Allergies:  No Known Allergies    Home Medications:  Prior to Admission Medications   Prescriptions Last Dose Informant Patient Reported? Taking? HYDROcodone-acetaminophen (NORCO) 7.5-325 mg per tablet 2020 at Unknown time  Yes Yes   Sig: Take 1 Tab by mouth three (3) times daily as needed. Tramadol (RYZOLT) 300 mg  at Unknown time  Yes Yes   Sig: Take 300 mg by mouth daily. acetaminophen (TYLENOL ARTHRITIS PO) 2020 at Unknown time  Yes Yes   Sig: Take  by mouth as needed.    atorvastatin (LIPITOR) 10 mg tablet 2020 at Unknown time  Yes Yes   Sig: Take 10 mg by mouth nightly. bisoprolol (ZEBETA) 5 mg tablet 2020 at Unknown time  Yes Yes   Sig: Take 5 mg by mouth daily. chlorthalidone (HYGROTEN) 25 mg tablet 2020 at Unknown time  Yes Yes   Sig: Take 25 mg by mouth daily. cyclobenzaprine (FLEXERIL) 10 mg tablet 2020 at Unknown time  Yes Yes   Sig: Take 10-20 mg by mouth as needed for Muscle Spasm(s). dulaglutide (Trulicity) 1.5 IH/9.6 mL sub-q pen 2020 at Unknown time  Yes Yes   Si.5 mg by SubCUTAneous route every seven (7) days. gabapentin (NEURONTIN) 300 mg capsule 2020 at Unknown time  Yes Yes   Sig: Take 900 mg by mouth two (2) times a day. insulin glargine,hum.rec.anlog (BASAGLAR KWIKPEN U-100 INSULIN SC) 2020 at Unknown time  Yes Yes   Si Units by SubCUTAneous route daily. metFORMIN ER (glucophage XR) 500 mg tablet 2020 at Unknown time  Yes Yes   Sig: Take 500 mg by mouth two (2) times a day. valsartan (Diovan) 320 mg tablet 2020 at Unknown time  Yes Yes   Sig: Take 320 mg by mouth daily.       Facility-Administered Medications: None       Hospital Medications:  Current Facility-Administered Medications   Medication Dose Route Frequency    0.9% sodium chloride infusion  50 mL/hr IntraVENous CONTINUOUS    sodium chloride (NS) flush 5-40 mL  5-40 mL IntraVENous Q8H    sodium chloride (NS) flush 5-40 mL  5-40 mL IntraVENous PRN    midazolam (VERSED) injection 0.25-5 mg  0.25-5 mg IntraVENous Multiple    fentaNYL citrate (PF) injection  mcg   mcg IntraVENous Multiple    naloxone (NARCAN) injection 0.4 mg  0.4 mg IntraVENous Multiple    flumazeniL (ROMAZICON) 0.1 mg/mL injection 0.2 mg  0.2 mg IntraVENous Multiple    simethicone (MYLICON) 49EV/4.8UA oral drops 80 mg  1.2 mL Oral Multiple    atropine injection 0.5 mg  0.5 mg IntraVENous ONCE PRN    EPINEPHrine (ADRENALIN) 0.1 mg/mL syringe 1 mg  1 mg Endoscopically ONCE PRN       Social History:  Social History     Tobacco Use    Smoking status: Never Smoker    Smokeless tobacco: Never Used   Substance Use Topics    Alcohol use: Yes     Comment: socially       Family History:  Family History   Problem Relation Age of Onset    Diabetes Maternal Grandmother              Review of Systems:      Constitutional: negative fever, negative chills, negative weight loss  Eyes:   negative visual changes  ENT:   negative sore throat, tongue or lip swelling  Respiratory:  negative cough, negative dyspnea  Cards:  negative for chest pain, palpitations, lower extremity edema  GI:   See HPI  :  negative for frequency, dysuria  Integument:  negative for rash and pruritus  Heme:  negative for easy bruising and gum/nose bleeding  Musculoskel: negative for myalgias,  back pain and muscle weakness  Neuro: negative for headaches, dizziness, vertigo  Psych:  negative for feelings of anxiety, depression       Objective:     Patient Vitals for the past 8 hrs:   BP Temp Pulse Resp SpO2 Height Weight   11/05/20 1041 (!) 140/81 98.3 °F (36.8 °C) 77 14 100 %     11/05/20 1019      6' (1.829 m) 110.2 kg (243 lb)     No intake/output data recorded. No intake/output data recorded. EXAM:     NEURO-a&o   HEENT-wnl   LUNGS-clear    COR-regular rate and rhythym     ABD-soft , no tenderness, no rebound, good bs     EXT-no edema     Data Review     No results for input(s): WBC, HGB, HCT, PLT, HGBEXT, HCTEXT, PLTEXT in the last 72 hours. No results for input(s): NA, K, CL, CO2, BUN, CREA, GLU, PHOS, CA in the last 72 hours. No results for input(s): AP, TBIL, TP, ALB, GLOB, GGT, AML, LPSE in the last 72 hours. No lab exists for component: SGOT, GPT, AMYP, HLPSE  No results for input(s): INR, PTP, APTT, INREXT in the last 72 hours.        Assessment:     · Generalized abdominal pain  · Belching     Patient Active Problem List   Diagnosis Code    TIA (transient ischemic attack) G45.9    Diabetes mellitus ([facility] E11.9    Essential hypertension, malignant I10    Hypertension I10    Gallstones K80.20     Plan:   · The patient was counseled at length about the risks of taylor Covid-19 in the celia-operative and post-operative states including the recovery window of their procedure. The patient was made aware that taylor Covid-19 after a surgical procedure may worsen their prognosis for recovering from the virus and lend to a higher morbidity and or mortality risk. The patient was given the options of postponing their procedure. All of the risks, benefits, and alternatives were discussed. The patient does wish to proceed with the procedure. · Endoscopic procedure with MAC     Signed By: Jeff Dexter.  Nayely Hale MD     11/5/2020  12:04 PM

## 2022-04-05 ENCOUNTER — TRANSCRIBE ORDER (OUTPATIENT)
Dept: SCHEDULING | Age: 62
End: 2022-04-05

## 2022-04-05 DIAGNOSIS — M54.16 LUMBAR RADICULOPATHY: ICD-10-CM

## 2022-04-05 DIAGNOSIS — M47.817 LUMBOSACRAL SPONDYLOSIS WITHOUT MYELOPATHY: Primary | ICD-10-CM

## 2022-04-14 ENCOUNTER — HOSPITAL ENCOUNTER (OUTPATIENT)
Dept: GENERAL RADIOLOGY | Age: 62
Discharge: HOME | End: 2022-04-14
Attending: PHYSICAL MEDICINE & REHABILITATION
Payer: MEDICARE

## 2022-04-14 ENCOUNTER — TRANSCRIBE ORDER (OUTPATIENT)
Dept: REGISTRATION | Age: 62
End: 2022-04-14

## 2022-04-14 ENCOUNTER — HOSPITAL ENCOUNTER (OUTPATIENT)
Dept: MRI IMAGING | Age: 62
Discharge: HOME | End: 2022-04-14
Attending: PHYSICAL MEDICINE & REHABILITATION
Payer: MEDICARE

## 2022-04-14 DIAGNOSIS — M54.16 LUMBAR RADICULOPATHY: ICD-10-CM

## 2022-04-14 DIAGNOSIS — M47.817 LUMBOSACRAL SPONDYLOSIS WITHOUT MYELOPATHY: Primary | ICD-10-CM

## 2022-04-14 DIAGNOSIS — M47.817 LUMBOSACRAL SPONDYLOSIS WITHOUT MYELOPATHY: ICD-10-CM

## 2022-04-14 PROCEDURE — 72110 X-RAY EXAM L-2 SPINE 4/>VWS: CPT

## 2022-04-14 PROCEDURE — 72148 MRI LUMBAR SPINE W/O DYE: CPT

## 2022-05-29 ENCOUNTER — HOSPITAL ENCOUNTER (INPATIENT)
Age: 62
LOS: 2 days | Discharge: HOME | DRG: 684 | End: 2022-05-31
Attending: EMERGENCY MEDICINE | Admitting: STUDENT IN AN ORGANIZED HEALTH CARE EDUCATION/TRAINING PROGRAM
Payer: MEDICARE

## 2022-05-29 ENCOUNTER — APPOINTMENT (OUTPATIENT)
Dept: ULTRASOUND IMAGING | Age: 62
DRG: 684 | End: 2022-05-29
Attending: STUDENT IN AN ORGANIZED HEALTH CARE EDUCATION/TRAINING PROGRAM
Payer: MEDICARE

## 2022-05-29 DIAGNOSIS — Z79.4 TYPE 2 DIABETES MELLITUS WITH HYPERGLYCEMIA, WITH LONG-TERM CURRENT USE OF INSULIN (HCC): ICD-10-CM

## 2022-05-29 DIAGNOSIS — E11.65 TYPE 2 DIABETES MELLITUS WITH HYPERGLYCEMIA, WITH LONG-TERM CURRENT USE OF INSULIN (HCC): ICD-10-CM

## 2022-05-29 DIAGNOSIS — I95.1 ORTHOSTASIS: ICD-10-CM

## 2022-05-29 DIAGNOSIS — R73.9 HYPERGLYCEMIA: ICD-10-CM

## 2022-05-29 DIAGNOSIS — N17.9 AKI (ACUTE KIDNEY INJURY) (HCC): Primary | ICD-10-CM

## 2022-05-29 LAB
ALBUMIN SERPL-MCNC: 3.4 G/DL (ref 3.5–5)
ALBUMIN SERPL-MCNC: 3.5 G/DL (ref 3.5–5)
ALBUMIN/GLOB SERPL: 0.9 {RATIO} (ref 1.1–2.2)
ALBUMIN/GLOB SERPL: 1 {RATIO} (ref 1.1–2.2)
ALP SERPL-CCNC: 114 U/L (ref 45–117)
ALP SERPL-CCNC: 96 U/L (ref 45–117)
ALT SERPL-CCNC: 32 U/L (ref 12–78)
ALT SERPL-CCNC: 32 U/L (ref 12–78)
ANION GAP SERPL CALC-SCNC: 4 MMOL/L (ref 5–15)
ANION GAP SERPL CALC-SCNC: 6 MMOL/L (ref 5–15)
AST SERPL-CCNC: 21 U/L (ref 15–37)
AST SERPL-CCNC: 26 U/L (ref 15–37)
ATRIAL RATE: 93 BPM
BASOPHILS # BLD: 0 K/UL (ref 0–0.1)
BASOPHILS NFR BLD: 1 % (ref 0–1)
BILIRUB SERPL-MCNC: 1.6 MG/DL (ref 0.2–1)
BILIRUB SERPL-MCNC: 1.9 MG/DL (ref 0.2–1)
BUN SERPL-MCNC: 33 MG/DL (ref 6–20)
BUN SERPL-MCNC: 43 MG/DL (ref 6–20)
BUN/CREAT SERPL: 17 (ref 12–20)
BUN/CREAT SERPL: 18 (ref 12–20)
CALCIUM SERPL-MCNC: 8.5 MG/DL (ref 8.5–10.1)
CALCIUM SERPL-MCNC: 8.7 MG/DL (ref 8.5–10.1)
CALCULATED P AXIS, ECG09: 63 DEGREES
CALCULATED R AXIS, ECG10: 119 DEGREES
CALCULATED T AXIS, ECG11: 24 DEGREES
CHLORIDE SERPL-SCNC: 90 MMOL/L (ref 97–108)
CHLORIDE SERPL-SCNC: 98 MMOL/L (ref 97–108)
CO2 SERPL-SCNC: 31 MMOL/L (ref 21–32)
CO2 SERPL-SCNC: 32 MMOL/L (ref 21–32)
COMMENT, HOLDF: NORMAL
COMMENT, HOLDF: NORMAL
CREAT SERPL-MCNC: 1.79 MG/DL (ref 0.7–1.3)
CREAT SERPL-MCNC: 2.46 MG/DL (ref 0.7–1.3)
DIAGNOSIS, 93000: NORMAL
DIFFERENTIAL METHOD BLD: ABNORMAL
EOSINOPHIL # BLD: 0.2 K/UL (ref 0–0.4)
EOSINOPHIL NFR BLD: 4 % (ref 0–7)
ERYTHROCYTE [DISTWIDTH] IN BLOOD BY AUTOMATED COUNT: 12 % (ref 11.5–14.5)
ERYTHROCYTE [DISTWIDTH] IN BLOOD BY AUTOMATED COUNT: 12.3 % (ref 11.5–14.5)
GLOBULIN SER CALC-MCNC: 3.3 G/DL (ref 2–4)
GLOBULIN SER CALC-MCNC: 3.8 G/DL (ref 2–4)
GLUCOSE BLD STRIP.AUTO-MCNC: 292 MG/DL (ref 65–117)
GLUCOSE SERPL-MCNC: 325 MG/DL (ref 65–100)
GLUCOSE SERPL-MCNC: 480 MG/DL (ref 65–100)
HCT VFR BLD AUTO: 38.1 % (ref 36.6–50.3)
HCT VFR BLD AUTO: 39.6 % (ref 36.6–50.3)
HGB BLD-MCNC: 13.7 G/DL (ref 12.1–17)
HGB BLD-MCNC: 14.3 G/DL (ref 12.1–17)
IMM GRANULOCYTES # BLD AUTO: 0 K/UL (ref 0–0.04)
IMM GRANULOCYTES NFR BLD AUTO: 1 % (ref 0–0.5)
LYMPHOCYTES # BLD: 1.9 K/UL (ref 0.8–3.5)
LYMPHOCYTES NFR BLD: 29 % (ref 12–49)
MCH RBC QN AUTO: 32.5 PG (ref 26–34)
MCH RBC QN AUTO: 32.6 PG (ref 26–34)
MCHC RBC AUTO-ENTMCNC: 36 G/DL (ref 30–36.5)
MCHC RBC AUTO-ENTMCNC: 36.1 G/DL (ref 30–36.5)
MCV RBC AUTO: 90 FL (ref 80–99)
MCV RBC AUTO: 90.7 FL (ref 80–99)
MONOCYTES # BLD: 0.4 K/UL (ref 0–1)
MONOCYTES NFR BLD: 7 % (ref 5–13)
NEUTS SEG # BLD: 3.8 K/UL (ref 1.8–8)
NEUTS SEG NFR BLD: 58 % (ref 32–75)
NRBC # BLD: 0 K/UL (ref 0–0.01)
NRBC # BLD: 0 K/UL (ref 0–0.01)
NRBC BLD-RTO: 0 PER 100 WBC
NRBC BLD-RTO: 0 PER 100 WBC
P-R INTERVAL, ECG05: 220 MS
PLATELET # BLD AUTO: 193 K/UL (ref 150–400)
PLATELET # BLD AUTO: 246 K/UL (ref 150–400)
PMV BLD AUTO: 10.7 FL (ref 8.9–12.9)
PMV BLD AUTO: 10.7 FL (ref 8.9–12.9)
POTASSIUM SERPL-SCNC: 3.3 MMOL/L (ref 3.5–5.1)
POTASSIUM SERPL-SCNC: 3.5 MMOL/L (ref 3.5–5.1)
PROT SERPL-MCNC: 6.7 G/DL (ref 6.4–8.2)
PROT SERPL-MCNC: 7.3 G/DL (ref 6.4–8.2)
Q-T INTERVAL, ECG07: 384 MS
QRS DURATION, ECG06: 102 MS
QTC CALCULATION (BEZET), ECG08: 477 MS
RBC # BLD AUTO: 4.2 M/UL (ref 4.1–5.7)
RBC # BLD AUTO: 4.4 M/UL (ref 4.1–5.7)
SAMPLES BEING HELD,HOLD: NORMAL
SAMPLES BEING HELD,HOLD: NORMAL
SERVICE CMNT-IMP: ABNORMAL
SODIUM SERPL-SCNC: 127 MMOL/L (ref 136–145)
SODIUM SERPL-SCNC: 134 MMOL/L (ref 136–145)
TROPONIN-HIGH SENSITIVITY: 7 NG/L (ref 0–76)
VENTRICULAR RATE, ECG03: 93 BPM
WBC # BLD AUTO: 6.4 K/UL (ref 4.1–11.1)
WBC # BLD AUTO: 6.6 K/UL (ref 4.1–11.1)

## 2022-05-29 PROCEDURE — 82962 GLUCOSE BLOOD TEST: CPT

## 2022-05-29 PROCEDURE — 96360 HYDRATION IV INFUSION INIT: CPT

## 2022-05-29 PROCEDURE — 80053 COMPREHEN METABOLIC PANEL: CPT

## 2022-05-29 PROCEDURE — 83036 HEMOGLOBIN GLYCOSYLATED A1C: CPT

## 2022-05-29 PROCEDURE — 96361 HYDRATE IV INFUSION ADD-ON: CPT

## 2022-05-29 PROCEDURE — 85027 COMPLETE CBC AUTOMATED: CPT

## 2022-05-29 PROCEDURE — 65270000032 HC RM SEMIPRIVATE

## 2022-05-29 PROCEDURE — 93005 ELECTROCARDIOGRAM TRACING: CPT

## 2022-05-29 PROCEDURE — 84300 ASSAY OF URINE SODIUM: CPT

## 2022-05-29 PROCEDURE — 99285 EMERGENCY DEPT VISIT HI MDM: CPT

## 2022-05-29 PROCEDURE — 74011250636 HC RX REV CODE- 250/636: Performed by: STUDENT IN AN ORGANIZED HEALTH CARE EDUCATION/TRAINING PROGRAM

## 2022-05-29 PROCEDURE — 76770 US EXAM ABDO BACK WALL COMP: CPT

## 2022-05-29 PROCEDURE — 36415 COLL VENOUS BLD VENIPUNCTURE: CPT

## 2022-05-29 PROCEDURE — 74011250636 HC RX REV CODE- 250/636: Performed by: EMERGENCY MEDICINE

## 2022-05-29 PROCEDURE — 85025 COMPLETE CBC W/AUTO DIFF WBC: CPT

## 2022-05-29 PROCEDURE — 82570 ASSAY OF URINE CREATININE: CPT

## 2022-05-29 PROCEDURE — 84484 ASSAY OF TROPONIN QUANT: CPT

## 2022-05-29 RX ORDER — SODIUM CHLORIDE, SODIUM LACTATE, POTASSIUM CHLORIDE, CALCIUM CHLORIDE 600; 310; 30; 20 MG/100ML; MG/100ML; MG/100ML; MG/100ML
75 INJECTION, SOLUTION INTRAVENOUS CONTINUOUS
Status: DISCONTINUED | OUTPATIENT
Start: 2022-05-29 | End: 2022-05-31 | Stop reason: HOSPADM

## 2022-05-29 RX ORDER — HEPARIN SODIUM 5000 [USP'U]/ML
5000 INJECTION, SOLUTION INTRAVENOUS; SUBCUTANEOUS EVERY 12 HOURS
Status: DISCONTINUED | OUTPATIENT
Start: 2022-05-29 | End: 2022-05-31 | Stop reason: HOSPADM

## 2022-05-29 RX ADMIN — SODIUM CHLORIDE, POTASSIUM CHLORIDE, SODIUM LACTATE AND CALCIUM CHLORIDE 75 ML/HR: 600; 310; 30; 20 INJECTION, SOLUTION INTRAVENOUS at 22:19

## 2022-05-29 RX ADMIN — HEPARIN SODIUM 5000 UNITS: 5000 INJECTION INTRAVENOUS; SUBCUTANEOUS at 20:38

## 2022-05-29 RX ADMIN — SODIUM CHLORIDE 1000 ML: 9 INJECTION, SOLUTION INTRAVENOUS at 17:02

## 2022-05-29 RX ADMIN — SODIUM CHLORIDE 1000 ML: 9 INJECTION, SOLUTION INTRAVENOUS at 14:48

## 2022-05-29 NOTE — PROGRESS NOTES
Famotidine - Renal dosing by Pharmacy  Current regimen:  20 mg Q 12 hr  Recent Labs     05/29/22  1250   CREA 2.46*   BUN 43*     Estimated CrCl:  38.1 ml/min    Plan:   Change to 20 mg  Q 24 hr with respect to estimated creatinine clearance (CrCl 38.1 mL/min) per [facility]/[employer]-approved Renal Dosing Adjustment protocol. Pharmacy will continue to monitor this patient daily for changes in clinical status and renal function. Please contact pharmacy with any questions/clarifications at .

## 2022-05-29 NOTE — ED PROVIDER NOTES
41-year-old male presents with dizziness worse with standing. He has a history of hypertension and takes chlorthalidone and bisoprolol. He denies any chest pain or shortness of breath. He states that he has had a 6 pound weight loss over the past couple months. This was intentional.  He had a back injection on Monday. He denies any complaints other than back pain. Past Medical History:   Diagnosis Date    Adverse effect of anesthesia     woke up during colonoscopy and felt pressure    Arthritis     Chronic kidney disease     has one kidney; born with only one    Chronic pain     back    Diabetes ([address]     Hypertension     Ill-defined condition 2013    [place] palsy    Sleep apnea     has CPAP, doesn't use it/uses it per pt on 8/7/2020    Sleep disorder     sleep apnea       Past Surgical History:   Procedure Laterality Date    COLONOSCOPY N/A 8/19/2020    COLONOSCOPY    :- performed by Porsche Carrillo MD at [address] HX CHOLECYSTECTOMY  02/10/2016    Laparoscopic Cholecystectomy    HX ORTHOPAEDIC      wrist cyst as teenager    HX UROLOGICAL  2008    lap sx to remove remnants of kidney (bladder knicked during this surgery and had an ileus)/second lap surgery on kidney to remove the rest of the remnants    [place] ABDOMEN SURGERY PROC UNLISTED      inguinal hernia repair as a child     [facility]  2/10/16    Laparoscopic cholecystectomy with cholangiogram, interpretation of cholangiogram          Family History:   Problem Relation Age of Onset    Diabetes Maternal Grandmother        Social History     Socioeconomic History    Marital status:      Spouse name: Not on file    Number of children: Not on file    Years of education: Not on file    Highest education level: Not on file   Occupational History    Not on file   Tobacco Use    Smoking status: Never Smoker    Smokeless tobacco: Never Used   Substance and Sexual Activity    Alcohol use:  Yes Comment: socially    Drug use: No    Sexual activity: Yes     Partners: Female   Other Topics Concern    Not on file   Social History Narrative    Not on file     Social Determinants of Health     Financial Resource Strain:     Difficulty of Paying Living Expenses: Not on file   Food Insecurity:     Worried About Running Out of Food in the Last Year: Not on file    Lele of Food in the Last Year: Not on file   Transportation Needs:     Lack of Transportation (Medical): Not on file    Lack of Transportation (Non-Medical): Not on file   Physical Activity:     Days of Exercise per Week: Not on file    Minutes of Exercise per Session: Not on file   Stress:     Feeling of Stress : Not on file   Social Connections:     Frequency of Communication with Friends and Family: Not on file    Frequency of Social Gatherings with Friends and Family: Not on file    Attends Anglican Services: Not on file    Active Member of [address] [place] [address] [employer] or Organizations: Not on file    Attends Club or Organization Meetings: Not on file    Marital Status: Not on file   Intimate Partner Violence:     Fear of Current or Ex-Partner: Not on file    Emotionally Abused: Not on file    Physically Abused: Not on file    Sexually Abused: Not on file   Housing Stability:     Unable to Pay for Housing in the Last Year: Not on file    Number of [place] in the Last Year: Not on file    Unstable Housing in the Last Year: Not on file         ALLERGIES: Patient has no known allergies. Review of Systems   All other systems reviewed and are negative. Vitals:    05/29/22 1237   BP: 99/67   Pulse: 96   Resp: 20   Temp: 98.2 °F (36.8 °C)   SpO2: 99%   Weight: 100 kg (220 lb 7.4 oz)   Height: 6' (1.829 m)            Physical Exam  Vitals and nursing note reviewed. Constitutional:       General: He is not in acute distress. HENT:      Head: Normocephalic and atraumatic. Eyes:      General: No scleral icterus.      Conjunctiva/sclera: Conjunctivae normal.      Pupils: Pupils are equal, round, and reactive to light. Neck:      Trachea: No tracheal deviation. Cardiovascular:      Rate and Rhythm: Normal rate and regular rhythm. Comments: Symptomatic with standing  Pulmonary:      Effort: Pulmonary effort is normal. No respiratory distress. Breath sounds: Normal breath sounds. No stridor. Abdominal:      General: There is no distension. Palpations: Abdomen is soft. Tenderness: There is no abdominal tenderness. Genitourinary:     Comments: deferred  Musculoskeletal:         General: No deformity. Cervical back: No rigidity. Skin:     General: Skin is warm and dry. Neurological:      General: No focal deficit present. Mental Status: He is alert. Comments: Alert and Oriented x3, Cranial nerves 2-12 intact, normal motor and sensation, negative Romberg, no pronator drift, normal finger to nose   Psychiatric:         Mood and Affect: Mood normal.         Behavior: Behavior normal.          MDM  Number of Diagnoses or Management Options  Diagnosis management comments: 51-year-old male presents with dizziness with standing, low blood pressure with differential diagnosis of hypovolemia, medication effect, electrolyte abnormality. ED Course as of 05/29/22 1311   Sun May 29, 2022   1258 EKG shows sinus rhythm with first-degree AV block at rate of 93, rightward axis, left posterior fascicular block, prior inferior infarct, no acute ischemia [TT]      ED Course User Index  [TT] Singh Ochoa MD       Procedures        Perfect Serve Consult for Admission  7:29 PM    ED Room Number: ER16/16  Patient Name and age:  Jose Parks 58 y.o.  male  Working Diagnosis:   1. JOSÉ (acute kidney injury) ([facility]    2. Hyperglycemia    3.  Orthostasis        COVID-19 Suspicion:  no  Sepsis present:  no  Reassessment needed: N/A  Code Status:  Full Code  Readmission: no  Isolation Requirements:  no  Recommended Level of Care:  telemetry  Department:[facility] Adult ED - 21   Other: Received 2 L of IV fluids but still orthostatic. Creatinine 2.5 unknown previous         PROGRESS NOTE:  7:31 PM discussed possibility of admission for further work-up and treatment versus outpatient management. Through shared decision making we decided to admit him to the hospital overnight. .  Patient meets criteria for admission. Discussed with patient/ family and they are in agreement with plan. LABORATORY TESTS:  Labs Reviewed   CBC WITH AUTOMATED DIFF - Abnormal; Notable for the following components:       Result Value    IMMATURE GRANULOCYTES 1 (*)     All other components within normal limits   METABOLIC PANEL, COMPREHENSIVE - Abnormal; Notable for the following components:    Sodium 127 (*)     Chloride 90 (*)     Glucose 480 (*)     BUN 43 (*)     Creatinine 2.46 (*)     GFR est AA 32 (*)     GFR est non-AA 27 (*)     Bilirubin, total 1.9 (*)     A-G Ratio 0.9 (*)     All other components within normal limits   TROPONIN-HIGH SENSITIVITY   SAMPLES BEING HELD       IMAGING RESULTS:  No orders to display       MEDICATIONS GIVEN:  Medications   sodium chloride 0.9 % bolus infusion 1,000 mL (0 mL IntraVENous IV Completed 5/29/22 1704)   sodium chloride 0.9 % bolus infusion 1,000 mL (0 mL IntraVENous IV Completed 5/29/22 1847)         IMPRESSION/ PLAN:  1. JOSÉ (acute kidney injury) ([address]    2. Hyperglycemia    3. Orthostasis      - admit to hospitalist    Total critical care time spent exclusive of procedures:  0 minutes      Jeffry De La Cruz MD    Please note that this dictation was completed with [employer], the computer voice recognition software. Quite often unanticipated grammatical, syntax, homophones, and other interpretive errors are inadvertently transcribed by the computer software. Please disregard these errors. Please excuse any errors that have escaped final proofreading.

## 2022-05-29 NOTE — ED NOTES
Bedside shift change report given to William Knapp RN (oncoming nurse) by Amari Nava (offgoing nurse). Report included the following information SBAR, ED Summary, Intake/Output, MAR, Recent Results and Med Rec Status.

## 2022-05-29 NOTE — ED TRIAGE NOTES
Patient is coming in with dizzy spells since Monday when he had back injection. Patient takes medicine for high blood pressure. Denies chest pain and SOB. Patient states head starts spinning and legs go out from under him.

## 2022-05-30 PROBLEM — E11.65 TYPE 2 DIABETES MELLITUS WITH HYPERGLYCEMIA (HCC): Status: ACTIVE | Noted: 2022-05-30

## 2022-05-30 LAB
ANION GAP SERPL CALC-SCNC: 3 MMOL/L (ref 5–15)
BILIRUB DIRECT SERPL-MCNC: 0.2 MG/DL (ref 0–0.2)
BILIRUB INDIRECT SERPL-MCNC: 0.9 MG/DL (ref 0–1.1)
BILIRUB SERPL-MCNC: 1.1 MG/DL (ref 0.2–1)
BUN SERPL-MCNC: 26 MG/DL (ref 6–20)
BUN/CREAT SERPL: 15 (ref 12–20)
CALCIUM SERPL-MCNC: 9.1 MG/DL (ref 8.5–10.1)
CHLORIDE SERPL-SCNC: 101 MMOL/L (ref 97–108)
CO2 SERPL-SCNC: 32 MMOL/L (ref 21–32)
CREAT SERPL-MCNC: 1.72 MG/DL (ref 0.7–1.3)
CREAT UR-MCNC: 62.2 MG/DL
GLUCOSE BLD STRIP.AUTO-MCNC: 261 MG/DL (ref 65–117)
GLUCOSE BLD STRIP.AUTO-MCNC: 269 MG/DL (ref 65–117)
GLUCOSE BLD STRIP.AUTO-MCNC: 453 MG/DL (ref 65–117)
GLUCOSE SERPL-MCNC: 300 MG/DL (ref 65–100)
MAGNESIUM SERPL-MCNC: 1.8 MG/DL (ref 1.6–2.4)
POTASSIUM SERPL-SCNC: 3.7 MMOL/L (ref 3.5–5.1)
SERVICE CMNT-IMP: ABNORMAL
SODIUM SERPL-SCNC: 136 MMOL/L (ref 136–145)
SODIUM UR-SCNC: 34 MMOL/L

## 2022-05-30 PROCEDURE — 82962 GLUCOSE BLOOD TEST: CPT

## 2022-05-30 PROCEDURE — 82248 BILIRUBIN DIRECT: CPT

## 2022-05-30 PROCEDURE — 74011250637 HC RX REV CODE- 250/637: Performed by: STUDENT IN AN ORGANIZED HEALTH CARE EDUCATION/TRAINING PROGRAM

## 2022-05-30 PROCEDURE — 74011250637 HC RX REV CODE- 250/637: Performed by: HOSPITALIST

## 2022-05-30 PROCEDURE — 74011636637 HC RX REV CODE- 636/637: Performed by: HOSPITALIST

## 2022-05-30 PROCEDURE — 94762 N-INVAS EAR/PLS OXIMTRY CONT: CPT

## 2022-05-30 PROCEDURE — 65270000046 HC RM TELEMETRY

## 2022-05-30 PROCEDURE — 74011250636 HC RX REV CODE- 250/636: Performed by: STUDENT IN AN ORGANIZED HEALTH CARE EDUCATION/TRAINING PROGRAM

## 2022-05-30 PROCEDURE — 80048 BASIC METABOLIC PNL TOTAL CA: CPT

## 2022-05-30 PROCEDURE — 74011000250 HC RX REV CODE- 250: Performed by: STUDENT IN AN ORGANIZED HEALTH CARE EDUCATION/TRAINING PROGRAM

## 2022-05-30 PROCEDURE — 36415 COLL VENOUS BLD VENIPUNCTURE: CPT

## 2022-05-30 PROCEDURE — 83735 ASSAY OF MAGNESIUM: CPT

## 2022-05-30 RX ORDER — ATORVASTATIN CALCIUM 10 MG/1
10 TABLET, FILM COATED ORAL
Status: DISCONTINUED | OUTPATIENT
Start: 2022-05-30 | End: 2022-05-31 | Stop reason: HOSPADM

## 2022-05-30 RX ORDER — MAGNESIUM SULFATE 100 %
4 CRYSTALS MISCELLANEOUS AS NEEDED
Status: DISCONTINUED | OUTPATIENT
Start: 2022-05-30 | End: 2022-05-31 | Stop reason: HOSPADM

## 2022-05-30 RX ORDER — CHLORTHALIDONE 25 MG/1
25 TABLET ORAL DAILY
Status: DISCONTINUED | OUTPATIENT
Start: 2022-05-30 | End: 2022-05-30

## 2022-05-30 RX ORDER — CYCLOBENZAPRINE HCL 10 MG
10-20 TABLET ORAL AS NEEDED
Status: DISCONTINUED | OUTPATIENT
Start: 2022-05-30 | End: 2022-05-31 | Stop reason: HOSPADM

## 2022-05-30 RX ORDER — METOPROLOL SUCCINATE 50 MG/1
50 TABLET, EXTENDED RELEASE ORAL DAILY
Status: DISCONTINUED | OUTPATIENT
Start: 2022-05-30 | End: 2022-05-31 | Stop reason: HOSPADM

## 2022-05-30 RX ORDER — GABAPENTIN 300 MG/1
900 CAPSULE ORAL 2 TIMES DAILY
Status: DISCONTINUED | OUTPATIENT
Start: 2022-05-30 | End: 2022-05-31 | Stop reason: HOSPADM

## 2022-05-30 RX ORDER — TRAMADOL HYDROCHLORIDE 50 MG/1
100 TABLET ORAL 3 TIMES DAILY
Status: DISCONTINUED | OUTPATIENT
Start: 2022-05-30 | End: 2022-05-31 | Stop reason: HOSPADM

## 2022-05-30 RX ORDER — INSULIN LISPRO 100 [IU]/ML
INJECTION, SOLUTION INTRAVENOUS; SUBCUTANEOUS
Status: DISCONTINUED | OUTPATIENT
Start: 2022-05-30 | End: 2022-05-31

## 2022-05-30 RX ORDER — HYDRALAZINE HYDROCHLORIDE 20 MG/ML
10 INJECTION INTRAMUSCULAR; INTRAVENOUS
Status: DISCONTINUED | OUTPATIENT
Start: 2022-05-30 | End: 2022-05-31 | Stop reason: HOSPADM

## 2022-05-30 RX ORDER — INSULIN LISPRO 100 [IU]/ML
12 INJECTION, SOLUTION INTRAVENOUS; SUBCUTANEOUS ONCE
Status: COMPLETED | OUTPATIENT
Start: 2022-05-30 | End: 2022-05-30

## 2022-05-30 RX ORDER — HYDROCODONE BITARTRATE AND ACETAMINOPHEN 7.5; 325 MG/1; MG/1
1 TABLET ORAL
Status: DISCONTINUED | OUTPATIENT
Start: 2022-05-30 | End: 2022-05-31 | Stop reason: HOSPADM

## 2022-05-30 RX ORDER — LOSARTAN POTASSIUM 50 MG/1
100 TABLET ORAL DAILY
Status: DISCONTINUED | OUTPATIENT
Start: 2022-05-30 | End: 2022-05-30

## 2022-05-30 RX ADMIN — TRAMADOL HYDROCHLORIDE 100 MG: 50 TABLET, COATED ORAL at 16:39

## 2022-05-30 RX ADMIN — GABAPENTIN 900 MG: 300 CAPSULE ORAL at 16:40

## 2022-05-30 RX ADMIN — Medication 3 UNITS: at 21:54

## 2022-05-30 RX ADMIN — GABAPENTIN 900 MG: 300 CAPSULE ORAL at 10:02

## 2022-05-30 RX ADMIN — CYCLOBENZAPRINE 10 MG: 10 TABLET, FILM COATED ORAL at 20:35

## 2022-05-30 RX ADMIN — TRAMADOL HYDROCHLORIDE 100 MG: 50 TABLET, COATED ORAL at 21:54

## 2022-05-30 RX ADMIN — TRAMADOL HYDROCHLORIDE 100 MG: 50 TABLET, COATED ORAL at 10:02

## 2022-05-30 RX ADMIN — Medication 12 UNITS: at 13:13

## 2022-05-30 RX ADMIN — HEPARIN SODIUM 5000 UNITS: 5000 INJECTION INTRAVENOUS; SUBCUTANEOUS at 10:02

## 2022-05-30 RX ADMIN — HYDROCODONE BITARTRATE AND ACETAMINOPHEN 1 TABLET: 7.5; 325 TABLET ORAL at 06:07

## 2022-05-30 RX ADMIN — ATORVASTATIN CALCIUM 10 MG: 10 TABLET, FILM COATED ORAL at 06:08

## 2022-05-30 RX ADMIN — ATORVASTATIN CALCIUM 10 MG: 10 TABLET, FILM COATED ORAL at 21:54

## 2022-05-30 RX ADMIN — HEPARIN SODIUM 5000 UNITS: 5000 INJECTION INTRAVENOUS; SUBCUTANEOUS at 20:30

## 2022-05-30 RX ADMIN — FAMOTIDINE 20 MG: 10 INJECTION INTRAVENOUS at 10:02

## 2022-05-30 RX ADMIN — Medication 5 UNITS: at 17:48

## 2022-05-30 NOTE — PROGRESS NOTES
[facility] Adult  Hospitalist Group                                                                                          Hospitalist Progress Note  Martínez Hale MD  Answering service: [phone] OR [phone] from in house phone        Date of Service:  2022  NAME:  Jose Parks  :  1960  MRN:  [id number]      Admission Summary:   Admitted for hypotension and dizziness    Interval history / Subjective:   F/u for JOSÉ     Assessment & Plan:     Dizziness in the setting of multiple BP meds and hypotension with JOSÉ    -- Hold ACE/ Chlorthalidone and BB   - - may resume BB later  -- repeat BMP cr inproving  -- ECHO as out pt  -- Receltly lost weight may nort require 3 BP meds    Back pain  -- on tramadol 300 and dereck 900 bid  -- probably contributed as well  -- d/w pt    Mild hypokalemia- probably diuretics related , replete    Mildly elevated bili- probably MCCULLOUGH pt trying to loose weight       Code status: Full  DVT prophylaxis: SCD    Care Plan discussed with: Patient/Family and Nurse  Anticipated Disposition: Home w/Family  Anticipated Discharge: Less than 24 hours     Hospital Problems  Date Reviewed: 2022          Codes Class Noted POA    JOSÉ (acute kidney injury) ([facility] ICD-10-CM: N17.9  ICD-9-CM: 584.9  2022 Unknown        Diabetes mellitus ([facility] ICD-10-CM: E11.9  ICD-9-CM: 250.00  2012 Yes        Essential hypertension, malignant ICD-10-CM: I10  ICD-9-CM: 401.0  2012 Yes                Review of Systems:   A comprehensive review of systems was negative. Vital Signs:    Last 24hrs VS reviewed since prior progress note.  Most recent are:  Visit Vitals  BP (!) 151/81 (BP 1 Location: Left upper arm, BP Patient Position: At rest)   Pulse 82   Temp 97.9 °F (36.6 °C)   Resp 14   Ht 6' (1.829 m)   Wt 100 kg (220 lb 7.4 oz)   SpO2 97%   BMI 29.90 kg/m²         Intake/Output Summary (Last 24 hours) at 2022 0944  Last data filed at 2022 0610  Gross per 24 hour Intake 2000 ml   Output 800 ml   Net 1200 ml        Physical Examination:     I had a face to face encounter with this patient and independently examined them on 5/30/2022 as outlined below:          General : alert x 3, awake, no acute distress, resting in bed, pleasant   HEENT: PEERL, EOMI, moist mucus membrane, TM clear  Neck: supple, no JVD, no meningeal signs  Chest: Clear to auscultation bilaterally   CVS: S1 S2 heard, Capillary refill less than 2 seconds  Abd: soft/ Non tender, non distended, BS physiological,   Ext: no clubbing, no cyanosis, no edema, brisk 2+ DP pulses  Neuro/Psych: pleasant mood and affect,     Data Review:    I personally reviewed  Image and labs      Labs:     Recent Labs     05/29/22 2241 05/29/22  1250   WBC 6.6 6.4   HGB 13.7 14.3   HCT 38.1 39.6    246     Recent Labs     05/30/22 0415 05/29/22 2241 05/29/22  1250   NA  --  134* 127*   K  --  3.3* 3.5   CL  --  98 90*   CO2  --  32 31   BUN  --  33* 43*   CREA  --  1.79* 2.46*   GLU  --  325* 480*   CA  --  8.7 8.5   MG 1.8  --   --      Recent Labs     05/30/22 0415 05/29/22 2241 05/29/22  1250   ALT  --  32 32   AP  --  96 114   TBILI 1.1* 1.6* 1.9*   TP  --  6.7 7.3   ALB  --  3.4* 3.5   GLOB  --  3.3 3.8     No results for input(s): INR, PTP, APTT, INREXT in the last 72 hours. No results for input(s): FE, TIBC, PSAT, FERR in the last 72 hours. No results found for: FOL, RBCF   No results for input(s): PH, PCO2, PO2 in the last 72 hours. No results for input(s): CPK, CKNDX, TROIQ in the last 72 hours.     No lab exists for component: CPKMB  Lab Results   Component Value Date/Time    Cholesterol, total 186 08/03/2012 03:50 AM    HDL Cholesterol 57 08/03/2012 03:50 AM    LDL, calculated 104.2 (H) 08/03/2012 03:50 AM    Triglyceride 124 08/03/2012 03:50 AM    CHOL/HDL Ratio 3.3 08/03/2012 03:50 AM     Lab Results   Component Value Date/Time    Glucose (POC) 292 (H) 05/29/2022 10:39 PM    Glucose (POC) 134 (H) 11/05/2020 11:03 AM    Glucose (POC) 113 (H) 08/19/2020 10:30 AM    Glucose (POC) 418 (H) 02/11/2016 11:15 AM    Glucose (POC) 287 (H) 02/11/2016 08:29 AM     Lab Results   Component Value Date/Time    Color YELLOW 08/01/2012 11:00 PM    Appearance CLEAR 08/01/2012 11:00 PM    Specific gravity 1.025 08/01/2012 11:00 PM    pH (UA) 6.5 08/01/2012 11:00 PM    Protein 30 (A) 08/01/2012 11:00 PM    Glucose >1000 (A) 08/01/2012 11:00 PM    Ketone NEGATIVE  08/01/2012 11:00 PM    Bilirubin NEGATIVE  08/01/2012 11:00 PM    Urobilinogen 0.2 08/01/2012 11:00 PM    Nitrites NEGATIVE  08/01/2012 11:00 PM    Leukocyte Esterase TRACE (A) 08/01/2012 11:00 PM    Epithelial cells 0-5 08/01/2012 11:00 PM    Bacteria 4+ (A) 08/01/2012 11:00 PM    WBC 20-50 08/01/2012 11:00 PM    RBC 0-3 08/01/2012 11:00 PM         Medications Reviewed:     Current Facility-Administered Medications   Medication Dose Route Frequency    [Held by provider] metoprolol succinate (TOPROL-XL) XL tablet 50 mg  50 mg Oral DAILY    atorvastatin (LIPITOR) tablet 10 mg  10 mg Oral QHS    gabapentin (NEURONTIN) capsule 900 mg  900 mg Oral BID    HYDROcodone-acetaminophen (NORCO) 7.5-325 mg per tablet 1 Tablet  1 Tablet Oral TID PRN    cyclobenzaprine (FLEXERIL) tablet 10-20 mg  10-20 mg Oral PRN    hydrALAZINE (APRESOLINE) 20 mg/mL injection 10 mg  10 mg IntraVENous Q6H PRN    traMADoL (ULTRAM) tablet 100 mg  100 mg Oral TID    lactated Ringers infusion  75 mL/hr IntraVENous CONTINUOUS    famotidine (PF) (PEPCID) 20 mg in 0.9% sodium chloride 10 mL injection  20 mg IntraVENous DAILY    [Held by provider] lactated Ringers infusion  75 mL/hr IntraVENous CONTINUOUS    heparin (porcine) injection 5,000 Units  5,000 Units SubCUTAneous Q12H     ______________________________________________________________________  EXPECTED LENGTH OF STAY: - - -  ACTUAL LENGTH OF STAY:          1      Please note that this dictation was completed with [place], the computer voice recognition software. Quite often unanticipated grammatical, syntax, homophones, and other interpretive errors are inadvertently transcribed by the computer software. Please disregard these errors. Please excuse any errors that have escaped final proofreading.                 Karoline León MD

## 2022-05-30 NOTE — PROGRESS NOTES
Occupational Therapy:    Chart reviewed, per patient and PT he has been completing functional mobility and ADL at baseline, no concerns for discharge. Will discontinue OT orders, please reconsult if change in functional status.       Lucy He, OT

## 2022-05-30 NOTE — H&P
PLEASE NOTE: I HAVE GENERATED THIS NOTE WITH THE ASSISTANCE OF VOICE-RECOGNITION TECHNOLOGY. PLEASE EXCUSE ANY SPELLING, GRAMMATICAL, AND SYNTAX ERRORS YOU MAY FIND. IF YOU NEED CLARIFICATION ON ANYTHING, PLEASE FEEL FREE TO REACH OUT TO ME.  [address] [facility]  Hospitalist [facility]  History and Physical - Dr. Cinthia Mcneil    Primary Care Provider: Jaycob Spence MD  Date of Service:  See Date Note Was Originated    Chief Complaint: Dizziness    Subjective:     58 y.o. male presents with few days duration of abrupt onset, gradually worsening, severe, constant, dizziness that is not associated with any other symptoms. Patient denies exacerbating features  and denies remitting features. Review of Systems:  12 point ROS obtained and otherwise negative, except as per HPI and above.     Past Medical History:   Diagnosis Date    Adverse effect of anesthesia     woke up during colonoscopy and felt pressure    Arthritis     Chronic kidney disease     has one kidney; born with only one    Chronic pain     back    Diabetes ([facility]     Hypertension     Ill-defined condition 2013    [place] palsy    Sleep apnea     has CPAP, doesn't use it/uses it per pt on 8/7/2020    Sleep disorder     sleep apnea      Past Surgical History:   Procedure Laterality Date    COLONOSCOPY N/A 8/19/2020    COLONOSCOPY    :- performed by Giovana Richter MD at [address] HX CHOLECYSTECTOMY  02/10/2016    Laparoscopic Cholecystectomy    HX ORTHOPAEDIC      wrist cyst as teenager    HX UROLOGICAL  2008    lap sx to remove remnants of kidney (bladder knicked during this surgery and had an ileus)/second lap surgery on kidney to remove the rest of the remnants    SD ABDOMEN SURGERY PROC UNLISTED      inguinal hernia repair as a child     [facility]  2/10/16    Laparoscopic cholecystectomy with cholangiogram, interpretation of cholangiogram      Prior to Admission medications    Medication Sig Start Date End Date Taking? Authorizing Provider   chlorthalidone (HYGROTEN) 25 mg tablet Take 25 mg by mouth daily. Provider, Historical   valsartan (Diovan) 320 mg tablet Take 320 mg by mouth daily. Provider, Historical   cyclobenzaprine (FLEXERIL) 10 mg tablet Take 10-20 mg by mouth as needed for Muscle Spasm(s). Provider, Historical   dulaglutide (Trulicity) 1.5 KA/8.4 mL sub-q pen 1.5 mg by SubCUTAneous route every seven (7) days. Provider, Historical   insulin glargine,hum.rec.anlog (BASAGLAR KWIKPEN U-100 INSULIN SC) 100 Units by SubCUTAneous route daily. Provider, Historical   metFORMIN ER (glucophage XR) 500 mg tablet Take 500 mg by mouth two (2) times a day. Provider, Historical   acetaminophen (TYLENOL ARTHRITIS PO) Take  by mouth as needed. Provider, Historical   bisoprolol (ZEBETA) 5 mg tablet Take 5 mg by mouth daily. Provider, Historical   atorvastatin (LIPITOR) 10 mg tablet Take 10 mg by mouth nightly. Provider, Historical   gabapentin (NEURONTIN) 300 mg capsule Take 900 mg by mouth two (2) times a day. Provider, Historical   HYDROcodone-acetaminophen (NORCO) 7.5-325 mg per tablet Take 1 Tab by mouth three (3) times daily as needed. Provider, Historical   Tramadol (RYZOLT) 300 mg TM24 Take 300 mg by mouth daily. 12/23/10   Other, MD Ivonne     No Known Allergies   Family History   Problem Relation Age of Onset    Diabetes Maternal Grandmother    . Social History     Socioeconomic History    Marital status:    Tobacco Use    Smoking status: Never Smoker    Smokeless tobacco: Never Used   Substance and Sexual Activity    Alcohol use: Yes     Comment: socially    Drug use: No    Sexual activity: Yes     Partners: Female   .     Objective:     Physical Exam:     VS as below    Const'l:          Obese body habitus, a&o, no acute distress  Head/Neck:       neck supple, no jvd, trachea midline, carotid midline, no cervical/head mass  Eyes:     kendrick, nonicteric sclera, eom intact  ENT:      auditory acuity grossly intact, no nasal deformity  Cardio:           Regular rate regular rhythm, no murmurs/rubs/gallops, no carotid bruit, normal s1, s2  Pulm:     no accessory muscle use, equal normal breath sounds bilaterally, clear tab  Abd:       S NT ND normal bowel sounds x 4 quadrants, no palpable masses  Derm:     no rashes, no ulcers, no lesions  Extr:      No edema, no cyanosis, no calf tenderness, no varicosities  Neuro:    cn II-XII grossly intact, muscle strength intact, sensation intact  Psych:   mood intact, judgement intact    Data Review: All diagnostic labs and studies have been reviewed. .    Assessment:     Active Problems:    Diabetes mellitus ([facility] (8/2/2012)      Essential hypertension, malignant (8/2/2012)      JOSÉ (acute kidney injury) ([facility] (5/29/2022)    Dizziness    Plan:     Acute kidney injury  -Fluid resuscitate  -Avoid nephrotoxic  -Bladder scan    Dizziness  -Patient has heart block on EKG  -Cardiology consult  -Echocardiogram  -CT head, no imaging was performed in the emergency department    Essential hypertension  -I am going to hold his home medications    Diabetes  -Sliding scale      Code status was discussed with the patient.   Code status entered as per the orders  Signed By: Angel Olsen DO

## 2022-05-30 NOTE — PROGRESS NOTES
Physical Therapy 5/30/2022    Orders received and chart reviewed up to date. Pt reports he has been ambulating without difficulty, no concerns for mobility, ADLs or IADLs. Wife bedside. Will complete orders. Thank you.   Chad Dockery, PT, DPT

## 2022-05-30 NOTE — PROGRESS NOTES
Transition of Care Plan   RUR- 7% Low Risk   DISPOSITION: The disposition plan is pending medical progression and recommendation.  F/U with PCP/Specialist     Transport: [facility]/family     Reason for Admission: \" low BP\"                   RUR Score:  7% Low Risk                Plan for utilizing home health: N/A       PCP: First and Last name:  Jaycob Spence MD     [facility] of [facility] current patient: Yes/No: Yes   Approximate date of last visit: Friday   Can you participate in a virtual visit with your PCP: N/A                    Current Advanced Directive/Advance Care Plan: Full Code  Has no ACP documentation on file at this time. Healthcare Decision Maker:   Click here to complete [address] including selection of the Healthcare Decision Maker Relationship (ie \"Primary\")   Spouse                      Transition of Care Plan:  Pending recommendation. Reviewed chart for transitions of care and discussed in rounds. CM met with patient at bedside to explain role and offer support. Patient is alert and oriented x4 and confirmed demographics. Baseline: DME - none  ADLs/IDALS: Independent   Previous Home Health: N/A  Previous SNF/IPR: N/A  ER Contact: Spouse     Patient lives in a 2 level house with 3 steps to enter. Patient lives with spouse. Patient is independent with ADLs/IDALs. Patient uses DMEs (none) to ambulate. Patient's preferred pharmacy is [facility]. Patient's spouse is expected to transport at discharge. Care Management Interventions  PCP Verified by CM: Yes  Palliative Care Criteria Met (RRAT>21 & CHF Dx)?: No  Mode of Transport at Discharge:  Other (see comment)  Transition of Care Consult (CM Consult): Discharge Planning  MyChart Signup: Yes  Discharge Durable Medical Equipment: No  Physical Therapy Consult: No  Occupational Therapy Consult: No  Speech Therapy Consult: No  Support Systems: Spouse/Significant Other,Other Family Member(s)  Confirm Follow Up Transport: Family  The Patient and/or Patient Representative was Provided with a Choice of Provider and Agrees with the Discharge Plan?: Yes  Freedom of Choice List was Provided with Basic Dialogue that Supports the Patient's Individualized Plan of Care/Goals, Treatment Preferences and Shares the Quality Data Associated with the Providers?: Yes  [place] Resource Information Provided?: No  Discharge Location  Patient Expects to be Discharged to[de-identified] Home    Medicare pt has received, reviewed, and signed 1st IM letter informing them of their right to appeal the discharge. Signed copy has been placed on pt bedside chart.     NYLA Nolasco, CRM, LMHP-e  Available in [employer]

## 2022-05-30 NOTE — PROGRESS NOTES
Bedside shift change report given to [address]  (oncoming nurse) by Kacey Odom RN  (offgoing nurse). Report included the following information SBAR, Kardex, MAR and Recent Results.

## 2022-05-30 NOTE — ED NOTES
TRANSFER - OUT REPORT:    Verbal report given to [facility] RN(name) on [place]  being transferred to (unit) for routine progression of care       Report consisted of patients Situation, Background, Assessment and   Recommendations(SBAR). Information from the following report(s) SBAR, ED Summary, [facility] and Recent Results was reviewed with the receiving nurse. Lines:   Peripheral IV 05/29/22 Right Antecubital (Active)   Site Assessment Clean, dry, & intact 05/29/22 1252   Phlebitis Assessment 0 05/29/22 1252   Infiltration Assessment 0 05/29/22 1252   Dressing Status Clean, dry, & intact 05/29/22 1252   Dressing Type Transparent 05/29/22 1252   Hub Color/Line Status Pink 05/29/22 1252   Action Taken Blood drawn 05/29/22 1252        Opportunity for questions and clarification was provided.       Patient transported with:   Registered Nurse

## 2022-05-30 NOTE — PROGRESS NOTES
Patient resting in bed, alert and oriented, possible discharge tomorrow, creatinine still elevated. Problem: Falls - Risk of  Goal: *Absence of Falls  Description: Document Minoo Chavez Fall Risk and appropriate interventions in the flowsheet.   Outcome: Progressing Towards Goal  Note: Fall Risk Interventions:                      History of Falls Interventions: Door open when patient unattended         Problem: Patient Education: Go to Patient Education Activity  Goal: Patient/Family Education  Outcome: Progressing Towards Goal

## 2022-05-31 ENCOUNTER — APPOINTMENT (OUTPATIENT)
Dept: NON INVASIVE DIAGNOSTICS | Age: 62
DRG: 684 | End: 2022-05-31
Attending: HOSPITALIST
Payer: MEDICARE

## 2022-05-31 VITALS
TEMPERATURE: 97.7 F | WEIGHT: 226.63 LBS | BODY MASS INDEX: 30.7 KG/M2 | DIASTOLIC BLOOD PRESSURE: 89 MMHG | RESPIRATION RATE: 16 BRPM | HEIGHT: 72 IN | HEART RATE: 76 BPM | SYSTOLIC BLOOD PRESSURE: 157 MMHG | OXYGEN SATURATION: 98 %

## 2022-05-31 LAB
ANION GAP SERPL CALC-SCNC: 8 MMOL/L (ref 5–15)
BUN SERPL-MCNC: 25 MG/DL (ref 6–20)
BUN/CREAT SERPL: 17 (ref 12–20)
CALCIUM SERPL-MCNC: 8.7 MG/DL (ref 8.5–10.1)
CHLORIDE SERPL-SCNC: 103 MMOL/L (ref 97–108)
CO2 SERPL-SCNC: 26 MMOL/L (ref 21–32)
CREAT SERPL-MCNC: 1.51 MG/DL (ref 0.7–1.3)
ECHO AO ROOT DIAM: 3.5 CM
ECHO AO ROOT INDEX: 1.56 CM/M2
ECHO AV MEAN GRADIENT: 4 MMHG
ECHO AV MEAN VELOCITY: 1 M/S
ECHO AV PEAK GRADIENT: 8 MMHG
ECHO AV PEAK VELOCITY: 1.4 M/S
ECHO AV VELOCITY RATIO: 0.79
ECHO AV VTI: 25.9 CM
ECHO EST RA PRESSURE: 3 MMHG
ECHO LA DIAMETER INDEX: 1.51 CM/M2
ECHO LA DIAMETER: 3.4 CM
ECHO LA TO AORTIC ROOT RATIO: 0.97
ECHO LV E' LATERAL VELOCITY: 10 CM/S
ECHO LV E' SEPTAL VELOCITY: 7 CM/S
ECHO LV FRACTIONAL SHORTENING: 27 % (ref 28–44)
ECHO LV INTERNAL DIMENSION DIASTOLE INDEX: 2.44 CM/M2
ECHO LV INTERNAL DIMENSION DIASTOLIC: 5.5 CM (ref 4.2–5.9)
ECHO LV INTERNAL DIMENSION SYSTOLIC INDEX: 1.78 CM/M2
ECHO LV INTERNAL DIMENSION SYSTOLIC: 4 CM
ECHO LV IVSD: 1 CM (ref 0.6–1)
ECHO LV MASS 2D: 213.2 G (ref 88–224)
ECHO LV MASS INDEX 2D: 94.7 G/M2 (ref 49–115)
ECHO LV POSTERIOR WALL DIASTOLIC: 1 CM (ref 0.6–1)
ECHO LV RELATIVE WALL THICKNESS RATIO: 0.36
ECHO LVOT AV VTI INDEX: 0.79
ECHO LVOT MEAN GRADIENT: 2 MMHG
ECHO LVOT PEAK GRADIENT: 5 MMHG
ECHO LVOT PEAK VELOCITY: 1.1 M/S
ECHO LVOT VTI: 20.4 CM
ECHO MV A VELOCITY: 1.08 M/S
ECHO MV AREA PHT: 4.2 CM2
ECHO MV E DECELERATION TIME (DT): 181.5 MS
ECHO MV E VELOCITY: 0.66 M/S
ECHO MV E/A RATIO: 0.61
ECHO MV E/E' LATERAL: 6.6
ECHO MV E/E' RATIO (AVERAGED): 8.01
ECHO MV E/E' SEPTAL: 9.43
ECHO MV PRESSURE HALF TIME (PHT): 52.6 MS
ECHO PV MAX VELOCITY: 1.4 M/S
ECHO PV PEAK GRADIENT: 8 MMHG
ECHO RV TAPSE: 2.6 CM (ref 1.7–?)
EST. AVERAGE GLUCOSE BLD GHB EST-MCNC: 240 MG/DL
GLUCOSE BLD STRIP.AUTO-MCNC: 295 MG/DL (ref 65–117)
GLUCOSE BLD STRIP.AUTO-MCNC: 304 MG/DL (ref 65–117)
GLUCOSE SERPL-MCNC: 268 MG/DL (ref 65–100)
HBA1C MFR BLD: 10 % (ref 4–5.6)
POTASSIUM SERPL-SCNC: 3.5 MMOL/L (ref 3.5–5.1)
SERVICE CMNT-IMP: ABNORMAL
SERVICE CMNT-IMP: ABNORMAL
SODIUM SERPL-SCNC: 137 MMOL/L (ref 136–145)

## 2022-05-31 PROCEDURE — 80048 BASIC METABOLIC PNL TOTAL CA: CPT

## 2022-05-31 PROCEDURE — 93306 TTE W/DOPPLER COMPLETE: CPT

## 2022-05-31 PROCEDURE — 74011250637 HC RX REV CODE- 250/637: Performed by: STUDENT IN AN ORGANIZED HEALTH CARE EDUCATION/TRAINING PROGRAM

## 2022-05-31 PROCEDURE — 74011250637 HC RX REV CODE- 250/637: Performed by: HOSPITALIST

## 2022-05-31 PROCEDURE — 36415 COLL VENOUS BLD VENIPUNCTURE: CPT

## 2022-05-31 PROCEDURE — 74011250636 HC RX REV CODE- 250/636: Performed by: STUDENT IN AN ORGANIZED HEALTH CARE EDUCATION/TRAINING PROGRAM

## 2022-05-31 PROCEDURE — 74011000250 HC RX REV CODE- 250: Performed by: STUDENT IN AN ORGANIZED HEALTH CARE EDUCATION/TRAINING PROGRAM

## 2022-05-31 PROCEDURE — 82962 GLUCOSE BLOOD TEST: CPT

## 2022-05-31 PROCEDURE — 74011636637 HC RX REV CODE- 636/637: Performed by: HOSPITALIST

## 2022-05-31 RX ORDER — INSULIN LISPRO 100 [IU]/ML
INJECTION, SOLUTION INTRAVENOUS; SUBCUTANEOUS
Status: DISCONTINUED | OUTPATIENT
Start: 2022-05-31 | End: 2022-05-31 | Stop reason: HOSPADM

## 2022-05-31 RX ORDER — INSULIN LISPRO 100 [IU]/ML
8 INJECTION, SOLUTION INTRAVENOUS; SUBCUTANEOUS
Status: DISCONTINUED | OUTPATIENT
Start: 2022-05-31 | End: 2022-05-31 | Stop reason: HOSPADM

## 2022-05-31 RX ORDER — INSULIN GLARGINE 100 [IU]/ML
40 INJECTION, SOLUTION SUBCUTANEOUS
Status: DISCONTINUED | OUTPATIENT
Start: 2022-05-31 | End: 2022-05-31 | Stop reason: HOSPADM

## 2022-05-31 RX ADMIN — HEPARIN SODIUM 5000 UNITS: 5000 INJECTION INTRAVENOUS; SUBCUTANEOUS at 07:50

## 2022-05-31 RX ADMIN — TRAMADOL HYDROCHLORIDE 100 MG: 50 TABLET, COATED ORAL at 08:56

## 2022-05-31 RX ADMIN — GABAPENTIN 900 MG: 300 CAPSULE ORAL at 08:57

## 2022-05-31 RX ADMIN — SODIUM CHLORIDE, POTASSIUM CHLORIDE, SODIUM LACTATE AND CALCIUM CHLORIDE 75 ML/HR: 600; 310; 30; 20 INJECTION, SOLUTION INTRAVENOUS at 07:45

## 2022-05-31 RX ADMIN — Medication 5 UNITS: at 12:19

## 2022-05-31 RX ADMIN — FAMOTIDINE 20 MG: 10 INJECTION INTRAVENOUS at 08:57

## 2022-05-31 RX ADMIN — Medication 7 UNITS: at 07:50

## 2022-05-31 NOTE — DIABETES MGMT
[address] [facility]    CLINICAL NURSE SPECIALIST CONSULT     Initial Presentation   Lopez Yo is a 58 y.o. male admitted 5/29 with c/o dizziness. EKG showed heart block    HX:   Past Medical History:   Diagnosis Date    Adverse effect of anesthesia     woke up during colonoscopy and felt pressure    Arthritis     Chronic kidney disease     has one kidney; born with only one    Chronic pain     back    Diabetes ([facility] [address]     Hypertension     Ill-defined condition 2013    [place] palsy    Sleep apnea     has CPAP, doesn't use it/uses it per pt on 8/7/2020    Sleep disorder     sleep apnea        INITIAL DX:   JOSÉ (acute kidney injury) ([facility] [address] [N17.9]     Current Treatment     TX: cardiology consult/     Consulted by Provider for advanced diabetes nursing assessment and care for:     [x] Home management assessment    Hospital Course   Clinical progress has been complicated by JOSÉ/ and dizziness -likely due to multiple BP medications    Diabetes History   DM2 x5 yrs. PCP - Dr. Elon Nageotte manages diabetes - Current A1C 10%-Prior A1C 3mo ago was 9.2% per his report. Diabetes-related Medical History  Acute complications  persistent hyperglycemia  Neurological complications  Peripheral neuropathy  Microvascular disease  CKD-  Macrovascular disease  none  Other associated conditions     Jassi/ htn/     Diabetes Medication History - Confirmed PTA insulin doses with patient. Key Antihyperglycemic Medications             dulaglutide (Trulicity) 1.5 SH/6.9 mL sub-q pen 1.5 mg by SubCUTAneous route every seven (7) days. insulin glargine,hum.rec.anlog (BASAGLAR KWIKPEN U-100 INSULIN SC) 100 Units by SubCUTAneous route daily. metFORMIN ER (glucophage XR) 500 mg tablet Take 500 mg by mouth two (2) times a day. Diabetes self-management practices:   Eating pattern- has recently started eating better, reducing portions.   However, does not monitor what he eats, loves the sweets, breads, etc. Consumes 4 Mt. Dews/day. Physical activity pattern-sedentary, desires to exercise but stressors at home are keeping him from doing so. Monitoring pattern- had not been checking due to difficulty with getting a blood sample - \" I have really thick skin\" -desires to get a CGM-    Taking medications pattern- admits to taking his insulin 5/7 days of the week. [x] Consistent administration  [x] Affordable  Social determinants of health impacting diabetes self-management practices   Concerned that you need to know more about how to stay healthy with diabetes  Overall evaluation:    [x]  A1c above target with drug therapy & self-care practices    Subjective   I have not been a good diabetic\"   Made aware of current above target A1C-did not realize it had jumped up. Wife at bedisde- also DM2  Raising their 8yo grand daughter   Increased stressors voiced at home    Objective   Physical exam  General Obese male in no acute distress. Conversant and cooperative  Neuro  Alert, oriented   Vital Signs   Visit Vitals  /72 (BP 1 Location: Left upper arm, BP Patient Position: At rest)   Pulse 76   Temp 97.4 °F (36.3 °C)   Resp 16   Ht 6' (1.829 m)   Wt 102.8 kg (226 lb 10.1 oz)   SpO2 94%   BMI 30.74 kg/m²     Skin  Warm and dry. Heart   Regular rate and rhythm. No murmurs, rubs or gallops  Lungs  Clear to auscultation without rales or rhonchi  Extremities No foot wounds    Diabetic foot exam: documented peripheral neuropathy; gets monthly pedicures, discussed seeing a podiatry provider at least yearly    Laboratory  Recent Labs     05/31/22  0436 05/30/22  1520 05/29/22  2241 05/29/22  1250 05/29/22  1250   * 300* 325*   < > 480*   AGAP 8 3* 4*   < > 6   WBC  --   --  6.6  --  6.4   CREA 1.51* 1.72* 1.79*   < > 2.46*   GFRNA 47* 40* 39*   < > 27*   AST  --   --  21  --  26   ALT  --   --  32  --  32    < > = values in this interval not displayed.        Factors impacting BG management  Factor Dose Comments   Nutrition:  Standard meals     60 grams/meal      Pain none    Infection NA    Other:   Kidney function  Liver function     GFR 47, improved from admission GFR 27      Blood glucose pattern      Significant diabetes-related events over the past 24-72 hours  A1C 10%  BG trends since admission >250  Required 20 units correctional on 5/30  Consuming diet >50%    Assessment and Plan   Nursing Diagnosis Risk for unstable blood glucose pattern   Nursing Intervention Domain 5250 Decision-making Support   Nursing Interventions Examined current inpatient diabetes/blood glucose control   Explored factors facilitating and impeding inpatient management  Explored corrective strategies with patient and responsible inpatient provider   Informed patient of rational for insulin strategy while hospitalized     Nursing Diagnosis 14347 Ineffective Health Management   Nursing Intervention Domain 5250 Decision-makingSupport   Nursing Interventions Identified diabetes self-management practices impeding diabetes control  Discussed diabetes survival skills related to  1. Healthy Plate eating plan; given handouts  2. Role of physical activity in improving insulin sensitivity and action  3. Procedure for blood glucose monitoring & options for low-cost products available from [facility]   4. Medications plan at discharge     Evaluation   Tania Onofre is a 63yo  male who has been living with DM2 for 5 yrs. Admitted with dizziness 2/2 multiple BP meds on board. JOSÉ also noted but now improving. Reviewed PTA diabetes medication doses with patient and confirmed PTA dosing. While inpatient he will require basal/bolus/correction insulin regimen to keep -180. This patient would benefit from diabetes self-management education and support ROD LEVI [facility]) after discharge/ will put in referral for DSMES. He and wife are both diabetics who desire to take better care of themselves.  Discussed ways to improve current diet -cutting out MT. Dews etc and starting moderate exercise for 30mn/day. Recommendations     [x] Use of Subcutaneous Insulin Order set (6719)  Insulin Dosing Specific recommendation   START Basal                                      (Based on weight, BMI & GFR) 40 units daily Lantus (0.4u/kg dosing)-START tonight    ADD pre meal -Nutritional                                      (Based on CHO/dextrose load) 8 units Humalog TID    MODIFY Corrective                           (Useful in adjusting insulin dosing) [x] Insulin-resistant sensitivity      Discharge Planning    1. CONTINUE PTA insulin dose/ and Trulicity   2. REDUCE Metformin dosing if GFR does not normalize- Metformin 500mg daily  3. Consider starting Humalog with meals to manage prandial hyperglycemia   4. On Discharge, please place an outpatient order for \"diabetes education\" (enter as REF20). This will trigger a referral for the Program for Diabetes Health which includes outpatient diabetes self management training with a certified diabetes educator- CNS will place order  Billing Code(s)   29344    Before making these care recommendations, I personally reviewed the hospitalization record, including notes, laboratory & diagnostic data and current medications, and examined the patient at the bedside (circumstances permitting) before making care recommendations. More than fifty (50) percent of the time was spent in patient counseling and/or care coordination.   Total minutes: [address] [facility] [place] Rexine Scheuermann, CNS  Diabetes Clinical Nurse Specialist  Program for Diabetes Health  Access via [employer]

## 2022-05-31 NOTE — PROGRESS NOTES
Patient for discharge today. Reviewed instructions, pt to follow up with [facility]. Problem: Falls - Risk of  Goal: *Absence of Falls  Description: Document [place] Fall Risk and appropriate interventions in the flowsheet.   5/31/2022 1526 by Theodore Harvey RN  Outcome: Resolved/Met  Note: Fall Risk Interventions:                      History of Falls Interventions: Door open when patient unattended      5/31/2022 1036 by Theodore Harvey RN  Outcome: Progressing Towards Goal  Note: Fall Risk Interventions:                      History of Falls Interventions: Door open when patient unattended         Problem: Patient Education: Go to Patient Education Activity  Goal: Patient/Family Education  5/31/2022 1526 by Theodore Harvey RN  Outcome: Resolved/Met  5/31/2022 1036 by Theodore Harvey RN  Outcome: Progressing Towards Goal     Problem: Patient Education: Go to Patient Education Activity  Goal: Patient/Family Education  5/31/2022 1526 by Theodore Harvey RN  Outcome: Resolved/Met  5/31/2022 1036 by Theodore Harvey RN  Outcome: Progressing Towards Goal     Problem: Acute Renal Failure: Day 1  Goal: Off Pathway (Use only if patient is Off Pathway)  5/31/2022 1526 by Theodore Harvey RN  Outcome: Resolved/Met  5/31/2022 1036 by Theodore Harvey RN  Outcome: Progressing Towards Goal  Goal: Activity/Safety  5/31/2022 1526 by Theodore Harvey RN  Outcome: Resolved/Met  5/31/2022 1036 by Theodore Harvey RN  Outcome: Progressing Towards Goal  Goal: Consults, if ordered  5/31/2022 1526 by Theodore Harvey RN  Outcome: Resolved/Met  5/31/2022 1036 by Theodore Harvey RN  Outcome: Progressing Towards Goal  Goal: Diagnostic Test/Procedures  5/31/2022 1526 by Theodore Harvey RN  Outcome: Resolved/Met  5/31/2022 1036 by Theodore Harvey RN  Outcome: Progressing Towards Goal  Goal: Nutrition/Diet  5/31/2022 1526 by Theodore Harvey RN  Outcome: Resolved/Met  5/31/2022 1036 by Theodore Harvey RN  Outcome: Progressing Towards Goal  Goal: Discharge Planning  5/31/2022 1526 by Mauro Hastings, RN  Outcome: Resolved/Met  5/31/2022 1036 by Mauro Hastings, RN  Outcome: Progressing Towards Goal  Goal: Medications  5/31/2022 1526 by Mauro Hastings, RN  Outcome: Resolved/Met  5/31/2022 1036 by Mauro Hastings, RN  Outcome: Progressing Towards Goal  Goal: Respiratory  5/31/2022 1526 by Mauro Hastings, RN  Outcome: Resolved/Met  5/31/2022 1036 by Mauro Hastings, RN  Outcome: Progressing Towards Goal  Goal: Treatments/Interventions/Procedures  5/31/2022 1526 by Mauro Hastings, RN  Outcome: Resolved/Met  5/31/2022 1036 by Mauro Hastings, RN  Outcome: Progressing Towards Goal  Goal: Psychosocial  5/31/2022 1526 by Mauro Hastings, RN  Outcome: Resolved/Met  5/31/2022 1036 by Mauro Hastings, RN  Outcome: Progressing Towards Goal  Goal: *Optimal pain control at patient's stated goal  5/31/2022 1526 by Mauro Hastings, RN  Outcome: Resolved/Met  5/31/2022 1036 by Mauro Hastings, RN  Outcome: Progressing Towards Goal  Goal: *Urinary output within identified parameters  5/31/2022 1526 by Mauro Hastings, RN  Outcome: Resolved/Met  5/31/2022 1036 by Mauro Hastings, RN  Outcome: Progressing Towards Goal  Goal: *Hemodynamically stable  5/31/2022 1526 by Mauro Hastings, RN  Outcome: Resolved/Met  5/31/2022 1036 by Mauro Hastings, RN  Outcome: Progressing Towards Goal  Goal: *Tolerating diet  5/31/2022 1526 by Mauro Hastings, RN  Outcome: Resolved/Met  5/31/2022 1036 by Mauro Hastings, RN  Outcome: Progressing Towards Goal     Problem: Acute Renal Failure: Day 2  Goal: Off Pathway (Use only if patient is Off Pathway)  5/31/2022 1526 by Mauro Hastings, RN  Outcome: Resolved/Met  5/31/2022 1036 by Mauro Hastings, RN  Outcome: Progressing Towards Goal  Goal: Activity/Safety  5/31/2022 1526 by Mauro Hastings, RN  Outcome: Resolved/Met  5/31/2022 1036 by Mauro Hastings, RN  Outcome: Progressing Towards Goal  Goal: Consults, if ordered  5/31/2022 1526 by Bren Mandujano RN  Outcome: Resolved/Met  5/31/2022 1036 by Bren Mandujano RN  Outcome: Progressing Towards Goal  Goal: Diagnostic Test/Procedures  5/31/2022 1526 by Bren Mandujano RN  Outcome: Resolved/Met  5/31/2022 1036 by Bren Mandujano RN  Outcome: Progressing Towards Goal  Goal: Nutrition/Diet  5/31/2022 1526 by Bren Mandujano RN  Outcome: Resolved/Met  5/31/2022 1036 by Bren Mandujano RN  Outcome: Progressing Towards Goal  Goal: Discharge Planning  5/31/2022 1526 by Bren Mandujano RN  Outcome: Resolved/Met  5/31/2022 1036 by Bren Mandujano RN  Outcome: Progressing Towards Goal  Goal: Medications  5/31/2022 1526 by Bren Mandujano RN  Outcome: Resolved/Met  5/31/2022 1036 by Bren Mandujano RN  Outcome: Progressing Towards Goal  Goal: Respiratory  5/31/2022 1526 by Bren Mandujano RN  Outcome: Resolved/Met  5/31/2022 1036 by Bren Mandujano RN  Outcome: Progressing Towards Goal  Goal: Treatments/Interventions/Procedures  5/31/2022 1526 by Bren Mandujano RN  Outcome: Resolved/Met  5/31/2022 1036 by Bren Mandujano RN  Outcome: Progressing Towards Goal  Goal: Psychosocial  5/31/2022 1526 by Bren Mandujano, RN  Outcome: Resolved/Met  5/31/2022 1036 by Bren Mandujano RN  Outcome: Progressing Towards Goal  Goal: *Optimal pain control at patient's stated goal  5/31/2022 1526 by Bren Mandujano RN  Outcome: Resolved/Met  5/31/2022 1036 by Bren Mandujano RN  Outcome: Progressing Towards Goal  Goal: *Urinary output within identified parameters  5/31/2022 1526 by Bren Mandujano RN  Outcome: Resolved/Met  5/31/2022 1036 by Bren Mandujano RN  Outcome: Progressing Towards Goal  Goal: *Hemodynamically stable  5/31/2022 1526 by Bren Mandujano RN  Outcome: Resolved/Met  5/31/2022 1036 by Bren Mandujano RN  Outcome: Progressing Towards Goal  Goal: *Tolerating diet  5/31/2022 1526 by Matias Brito RN  Outcome: Resolved/Met  5/31/2022 1036 by Matias Brito, RN  Outcome: Progressing Towards Goal  Goal: *Lab values improving  5/31/2022 1526 by Matias Brito, RN  Outcome: Resolved/Met  5/31/2022 1036 by Matias Brito RN  Outcome: Progressing Towards Goal     Problem: Acute Renal Failure: Day 3  Goal: Off Pathway (Use only if patient is Off Pathway)  5/31/2022 1526 by Matias Brito, RN  Outcome: Resolved/Met  5/31/2022 1036 by Matias Brito RN  Outcome: Progressing Towards Goal  Goal: Activity/Safety  5/31/2022 1526 by Matias Brito, RN  Outcome: Resolved/Met  5/31/2022 1036 by Matias Brito RN  Outcome: Progressing Towards Goal  Goal: Consults, if ordered  5/31/2022 1526 by Matias Brito, RN  Outcome: Resolved/Met  5/31/2022 1036 by Matias Brito, RN  Outcome: Progressing Towards Goal  Goal: Diagnostic Test/Procedures  5/31/2022 1526 by Matias Brito, RN  Outcome: Resolved/Met  5/31/2022 1036 by Matias Brito RN  Outcome: Progressing Towards Goal  Goal: Nutrition/Diet  5/31/2022 1526 by Matias Brito, RN  Outcome: Resolved/Met  5/31/2022 1036 by Matias Brito, RN  Outcome: Progressing Towards Goal  Goal: Discharge Planning  5/31/2022 1526 by Matias Brito, RN  Outcome: Resolved/Met  5/31/2022 1036 by Matias Brito RN  Outcome: Progressing Towards Goal  Goal: Medications  5/31/2022 1526 by Matias Brito, RN  Outcome: Resolved/Met  5/31/2022 1036 by Matias Brito, RN  Outcome: Progressing Towards Goal  Goal: Respiratory  5/31/2022 1526 by Matias Brito, RN  Outcome: Resolved/Met  5/31/2022 1036 by Matias Brito, RN  Outcome: Progressing Towards Goal  Goal: Treatments/Interventions/Procedures  5/31/2022 1526 by Matias Brito, RN  Outcome: Resolved/Met  5/31/2022 1036 by Matias Brito RN  Outcome: Progressing Towards Goal  Goal: Psychosocial  5/31/2022 1526 by Matias Brito, RN  Outcome: Resolved/Met  5/31/2022 1036 by Erminio Angelucci, RN  Outcome: Progressing Towards Goal  Goal: *Optimal pain control at patient's stated goal  5/31/2022 1526 by Erminio Angelucci, RN  Outcome: Resolved/Met  5/31/2022 1036 by Erminio Angelucci, RN  Outcome: Progressing Towards Goal  Goal: *Urinary output within identified parameters  5/31/2022 1526 by Erminio Angelucci, RN  Outcome: Resolved/Met  5/31/2022 1036 by Erminio Angelucci, RN  Outcome: Progressing Towards Goal  Goal: *Hemodynamically stable  5/31/2022 1526 by Erminio Angelucci, RN  Outcome: Resolved/Met  5/31/2022 1036 by Erminio Angelucci, RN  Outcome: Progressing Towards Goal  Goal: *Tolerating diet  5/31/2022 1526 by Erminio Angelucci, RN  Outcome: Resolved/Met  5/31/2022 1036 by Erminio Angelucci, RN  Outcome: Progressing Towards Goal  Goal: *Lab values improving  5/31/2022 1526 by Erminio Angelucci, RN  Outcome: Resolved/Met  5/31/2022 1036 by Erminio Angelucci, RN  Outcome: Progressing Towards Goal     Problem: Acute Renal Failure: Day 4  Goal: Off Pathway (Use only if patient is Off Pathway)  5/31/2022 1526 by Erminio Angelucci, RN  Outcome: Resolved/Met  5/31/2022 1036 by Erminio Angelucci, RN  Outcome: Progressing Towards Goal  Goal: Activity/Safety  5/31/2022 1526 by Erminio Angelucci, RN  Outcome: Resolved/Met  5/31/2022 1036 by Erminio Angelucci, RN  Outcome: Progressing Towards Goal  Goal: Consults, if ordered  5/31/2022 1526 by Erminio Angelucci, RN  Outcome: Resolved/Met  5/31/2022 1036 by Erminio Angelucci, RN  Outcome: Progressing Towards Goal  Goal: Diagnostic Test/Procedures  5/31/2022 1526 by Erminio Angelucci, RN  Outcome: Resolved/Met  5/31/2022 1036 by Erminio Angelucci, RN  Outcome: Progressing Towards Goal  Goal: Nutrition/Diet  5/31/2022 1526 by Erminio Angelucci, RN  Outcome: Resolved/Met  5/31/2022 1036 by Erminio Angelucci, RN  Outcome: Progressing Towards Goal  Goal: Discharge Planning  5/31/2022 1526 by Angelina Isaacs Alize Monday RN  Outcome: Resolved/Met  5/31/2022 1036 by Karla Santana RN  Outcome: Progressing Towards Goal  Goal: Medications  5/31/2022 1526 by Karla Santana, RN  Outcome: Resolved/Met  5/31/2022 1036 by Karla Santana, RN  Outcome: Progressing Towards Goal  Goal: Respiratory  5/31/2022 1526 by Karla Santana, RN  Outcome: Resolved/Met  5/31/2022 1036 by Karla Santana RN  Outcome: Progressing Towards Goal  Goal: Treatments/Interventions/Procedures  5/31/2022 1526 by Karla Santana, RN  Outcome: Resolved/Met  5/31/2022 1036 by Karla Santana RN  Outcome: Progressing Towards Goal  Goal: Psychosocial  5/31/2022 1526 by Karla Santana, RN  Outcome: Resolved/Met  5/31/2022 1036 by Karla Santana RN  Outcome: Progressing Towards Goal  Goal: *Optimal pain control at patient's stated goal  5/31/2022 1526 by Karla Santana, RN  Outcome: Resolved/Met  5/31/2022 1036 by Karla Santana RN  Outcome: Progressing Towards Goal  Goal: *Urinary output within identified parameters  5/31/2022 1526 by Karla Santana, RN  Outcome: Resolved/Met  5/31/2022 1036 by Karla Santana RN  Outcome: Progressing Towards Goal  Goal: *Hemodynamically stable  5/31/2022 1526 by Karla Santana, RN  Outcome: Resolved/Met  5/31/2022 1036 by Karla Santana RN  Outcome: Progressing Towards Goal  Goal: *Tolerating diet  5/31/2022 1526 by Karla Santana, RN  Outcome: Resolved/Met  5/31/2022 1036 by Karla Santana, RN  Outcome: Progressing Towards Goal  Goal: *Lab values improving  5/31/2022 1526 by Karla Santana, RN  Outcome: Resolved/Met  5/31/2022 1036 by Karla Santana, RN  Outcome: Progressing Towards Goal     Problem: Acute Renal Failure: Day 5  Goal: Off Pathway (Use only if patient is Off Pathway)  5/31/2022 1526 by Karla Santana RN  Outcome: Resolved/Met  5/31/2022 1036 by Karla Santana RN  Outcome: Progressing Towards Goal  Goal: Activity/Safety  5/31/2022 1526 by Poncho Bruce, Melani Muniz RN  Outcome: Resolved/Met  5/31/2022 1036 by Jerrell Andrea RN  Outcome: Progressing Towards Goal  Goal: Diagnostic Test/Procedures  5/31/2022 1526 by Jerrell Andrea RN  Outcome: Resolved/Met  5/31/2022 1036 by Jerrell Andrea RN  Outcome: Progressing Towards Goal  Goal: Nutrition/Diet  5/31/2022 1526 by Jerrell Andrea RN  Outcome: Resolved/Met  5/31/2022 1036 by Jerrell Andrea RN  Outcome: Progressing Towards Goal  Goal: Discharge Planning  5/31/2022 1526 by Jerrell Andrea RN  Outcome: Resolved/Met  5/31/2022 1036 by Jerrell Andrea RN  Outcome: Progressing Towards Goal  Goal: Medications  5/31/2022 1526 by Jerrell Andrea RN  Outcome: Resolved/Met  5/31/2022 1036 by Jerrell Andrea RN  Outcome: Progressing Towards Goal  Goal: Respiratory  5/31/2022 1526 by Jerrell Andrea RN  Outcome: Resolved/Met  5/31/2022 1036 by Jerrell Andrea RN  Outcome: Progressing Towards Goal  Goal: Treatments/Interventions/Procedures  5/31/2022 1526 by Jerrell Andrea RN  Outcome: Resolved/Met  5/31/2022 1036 by Jerrell Andrea RN  Outcome: Progressing Towards Goal  Goal: Psychosocial  5/31/2022 1526 by Jerrell Andrea RN  Outcome: Resolved/Met  5/31/2022 1036 by Jerrell Andrea RN  Outcome: Progressing Towards Goal  Goal: *Optimal pain control at patient's stated goal  5/31/2022 1526 by Jerrell Andrea RN  Outcome: Resolved/Met  5/31/2022 1036 by Jerrell Andrea RN  Outcome: Progressing Towards Goal  Goal: *Urinary output within identified parameters  5/31/2022 1526 by Jerrell Andrea, RN  Outcome: Resolved/Met  5/31/2022 1036 by Jerrell Andrea RN  Outcome: Progressing Towards Goal  Goal: *Hemodynamically stable  5/31/2022 1526 by Jerrell Andrea RN  Outcome: Resolved/Met  5/31/2022 1036 by Jerrell Andrea RN  Outcome: Progressing Towards Goal  Goal: *Tolerating diet  5/31/2022 1526 by Jerrell Andrea RN  Outcome: Resolved/Met  5/31/2022 1036 by Darshan Miner, Dafne Galarza RN  Outcome: Progressing Towards Goal  Goal: *Lab values improving  5/31/2022 1526 by Amauri Landin RN  Outcome: Resolved/Met  5/31/2022 1036 by Amauri Landin RN  Outcome: Progressing Towards Goal     Problem: Acute Renal Failure: Day 6  Goal: Off Pathway (Use only if patient is Off Pathway)  5/31/2022 1526 by Amauri Landin RN  Outcome: Resolved/Met  5/31/2022 1036 by Amauri Landin RN  Outcome: Progressing Towards Goal  Goal: Activity/Safety  5/31/2022 1526 by Amauri Landin RN  Outcome: Resolved/Met  5/31/2022 1036 by Amauri Landin RN  Outcome: Progressing Towards Goal  Goal: Diagnostic Test/Procedures  5/31/2022 1526 by Amauri Landin RN  Outcome: Resolved/Met  5/31/2022 1036 by Amauri Landin RN  Outcome: Progressing Towards Goal  Goal: Nutrition/Diet  5/31/2022 1526 by Amauri Lanidn RN  Outcome: Resolved/Met  5/31/2022 1036 by Amauri Landin RN  Outcome: Progressing Towards Goal  Goal: Discharge Planning  5/31/2022 1526 by Amauri Landin RN  Outcome: Resolved/Met  5/31/2022 1036 by Amauri Landin RN  Outcome: Progressing Towards Goal  Goal: Medications  5/31/2022 1526 by Amauri Landin RN  Outcome: Resolved/Met  5/31/2022 1036 by Amauri Landin RN  Outcome: Progressing Towards Goal  Goal: Respiratory  5/31/2022 1526 by Amauri Landin RN  Outcome: Resolved/Met  5/31/2022 1036 by Amauri Landin RN  Outcome: Progressing Towards Goal  Goal: Treatments/Interventions/Procedures  5/31/2022 1526 by Amauri Landin RN  Outcome: Resolved/Met  5/31/2022 1036 by Amauri Landin RN  Outcome: Progressing Towards Goal  Goal: Psychosocial  5/31/2022 1526 by Amauri Landin RN  Outcome: Resolved/Met  5/31/2022 1036 by Amauri Landin RN  Outcome: Progressing Towards Goal     Problem: Acute Renal Failure: Discharge Outcomes  Goal: *Optimal pain control at patient's stated goal  5/31/2022 1526 by Amauri Landin RN  Outcome: Resolved/Met  5/31/2022 1036 by Bren Mandujano RN  Outcome: Progressing Towards Goal  Goal: *Urinary output within identified parameters  5/31/2022 1526 by Bren Mandujano RN  Outcome: Resolved/Met  5/31/2022 1036 by Bren Mandujano RN  Outcome: Progressing Towards Goal  Goal: *Hemodynamically stable  5/31/2022 1526 by Bren Mandujano RN  Outcome: Resolved/Met  5/31/2022 1036 by Bren Mandujano RN  Outcome: Progressing Towards Goal  Goal: *Tolerating diet  5/31/2022 1526 by Bren Mandujano RN  Outcome: Resolved/Met  5/31/2022 1036 by Bren Mandujano RN  Outcome: Progressing Towards Goal  Goal: *Lab values stabilized  5/31/2022 1526 by Bren Mandujano RN  Outcome: Resolved/Met  5/31/2022 1036 by Bren Mandujano RN  Outcome: Progressing Towards Goal  Goal: *Verbalizes understanding and describes medication purposes and frequencies  5/31/2022 1526 by Bren Mandujano RN  Outcome: Resolved/Met  5/31/2022 1036 by Bren Mandujano RN  Outcome: Progressing Towards Goal  Goal: *Medication reconciliation  5/31/2022 1526 by Bren Mandujano RN  Outcome: Resolved/Met  5/31/2022 1036 by Bren Mandujano RN  Outcome: Progressing Towards Goal

## 2022-05-31 NOTE — DISCHARGE INSTRUCTIONS
Discharge Instructions       PATIENT ID: Kyle Jose  MRN: [id number]   YOB: 1960    DATE OF ADMISSION: 5/29/2022  2:28 PM    DATE OF DISCHARGE: 5/31/2022    PRIMARY CARE PROVIDER: Fco Rubio MD     ATTENDING PHYSICIAN: Milagro Watters MD  DISCHARGING PROVIDER: Srini Harris MD    To contact this individual call [phone] and ask the  to page. If unavailable ask to be transferred the Adult Hospitalist Department. DISCHARGE DIAGNOSES JOSÉ dizziness    CONSULTATIONS: IP CONSULT TO CARDIOLOGY    PROCEDURES/SURGERIES: * No surgery found *    PENDING TEST RESULTS:   At the time of discharge the following test results are still pending:     FOLLOW UP APPOINTMENTS:   Follow-up Information     Follow up With Specialties Details Why Contact Info    Fco Rubio MD Family Medicine In 1 week  [id number] [facility]  [phone]             ADDITIONAL CARE RECOMMENDATIONS:     DIET: Cardiac Diet and Diabetic Diet    ACTIVITY: Activity as tolerated    WOUND CARE:     EQUIPMENT needed:       Radiology      DISCHARGE MEDICATIONS:   See Medication Reconciliation Form    · It is important that you take the medication exactly as they are prescribed. · Keep your medication in the bottles provided by the pharmacist and keep a list of the medication names, dosages, and times to be taken in your wallet. · Do not take other medications without consulting your doctor. NOTIFY YOUR PHYSICIAN FOR ANY OF THE FOLLOWING:   Fever over 101 degrees for 24 hours. Chest pain, shortness of breath, fever, chills, nausea, vomiting, diarrhea, change in mentation, falling, weakness, bleeding. Severe pain or pain not relieved by medications. Or, any other signs or symptoms that you may have questions about.       DISPOSITION:  x  Home With:   OT  PT  HH  RN       SNF/Inpatient Rehab/LTAC    Independent/assisted living    Hospice    Other:     CDMP Checked:   Yes x PROBLEM LIST Updated:  Yes x       Signed:   Jere Stratton MD  5/31/2022  1:11 PM

## 2022-05-31 NOTE — PROGRESS NOTES
Patient resting in bed, awaiting ECHO for possible discharge. Notified ECHO  tech of need for early test this morning. Problem: Falls - Risk of  Goal: *Absence of Falls  Description: Document Jason Willard Fall Risk and appropriate interventions in the flowsheet.   Outcome: Progressing Towards Goal  Note: Fall Risk Interventions:                      History of Falls Interventions: Door open when patient unattended         Problem: Patient Education: Go to Patient Education Activity  Goal: Patient/Family Education  Outcome: Progressing Towards Goal     Problem: Patient Education: Go to Patient Education Activity  Goal: Patient/Family Education  Outcome: Progressing Towards Goal     Problem: Acute Renal Failure: Day 1  Goal: Off Pathway (Use only if patient is Off Pathway)  Outcome: Progressing Towards Goal  Goal: Activity/Safety  Outcome: Progressing Towards Goal  Goal: Consults, if ordered  Outcome: Progressing Towards Goal  Goal: Diagnostic Test/Procedures  Outcome: Progressing Towards Goal  Goal: Nutrition/Diet  Outcome: Progressing Towards Goal  Goal: Discharge Planning  Outcome: Progressing Towards Goal  Goal: Medications  Outcome: Progressing Towards Goal  Goal: Respiratory  Outcome: Progressing Towards Goal  Goal: Treatments/Interventions/Procedures  Outcome: Progressing Towards Goal  Goal: Psychosocial  Outcome: Progressing Towards Goal  Goal: *Optimal pain control at patient's stated goal  Outcome: Progressing Towards Goal  Goal: *Urinary output within identified parameters  Outcome: Progressing Towards Goal  Goal: *Hemodynamically stable  Outcome: Progressing Towards Goal  Goal: *Tolerating diet  Outcome: Progressing Towards Goal     Problem: Acute Renal Failure: Day 2  Goal: Off Pathway (Use only if patient is Off Pathway)  Outcome: Progressing Towards Goal  Goal: Activity/Safety  Outcome: Progressing Towards Goal  Goal: Consults, if ordered  Outcome: Progressing Towards Goal  Goal: Diagnostic Test/Procedures  Outcome: Progressing Towards Goal  Goal: Nutrition/Diet  Outcome: Progressing Towards Goal  Goal: Discharge Planning  Outcome: Progressing Towards Goal  Goal: Medications  Outcome: Progressing Towards Goal  Goal: Respiratory  Outcome: Progressing Towards Goal  Goal: Treatments/Interventions/Procedures  Outcome: Progressing Towards Goal  Goal: Psychosocial  Outcome: Progressing Towards Goal  Goal: *Optimal pain control at patient's stated goal  Outcome: Progressing Towards Goal  Goal: *Urinary output within identified parameters  Outcome: Progressing Towards Goal  Goal: *Hemodynamically stable  Outcome: Progressing Towards Goal  Goal: *Tolerating diet  Outcome: Progressing Towards Goal  Goal: *Lab values improving  Outcome: Progressing Towards Goal     Problem: Acute Renal Failure: Day 3  Goal: Off Pathway (Use only if patient is Off Pathway)  Outcome: Progressing Towards Goal  Goal: Activity/Safety  Outcome: Progressing Towards Goal  Goal: Consults, if ordered  Outcome: Progressing Towards Goal  Goal: Diagnostic Test/Procedures  Outcome: Progressing Towards Goal  Goal: Nutrition/Diet  Outcome: Progressing Towards Goal  Goal: Discharge Planning  Outcome: Progressing Towards Goal  Goal: Medications  Outcome: Progressing Towards Goal  Goal: Respiratory  Outcome: Progressing Towards Goal  Goal: Treatments/Interventions/Procedures  Outcome: Progressing Towards Goal  Goal: Psychosocial  Outcome: Progressing Towards Goal  Goal: *Optimal pain control at patient's stated goal  Outcome: Progressing Towards Goal  Goal: *Urinary output within identified parameters  Outcome: Progressing Towards Goal  Goal: *Hemodynamically stable  Outcome: Progressing Towards Goal  Goal: *Tolerating diet  Outcome: Progressing Towards Goal  Goal: *Lab values improving  Outcome: Progressing Towards Goal     Problem: Acute Renal Failure: Day 4  Goal: Off Pathway (Use only if patient is Off Pathway)  Outcome: Progressing Towards Goal  Goal: Activity/Safety  Outcome: Progressing Towards Goal  Goal: Consults, if ordered  Outcome: Progressing Towards Goal  Goal: Diagnostic Test/Procedures  Outcome: Progressing Towards Goal  Goal: Nutrition/Diet  Outcome: Progressing Towards Goal  Goal: Discharge Planning  Outcome: Progressing Towards Goal  Goal: Medications  Outcome: Progressing Towards Goal  Goal: Respiratory  Outcome: Progressing Towards Goal  Goal: Treatments/Interventions/Procedures  Outcome: Progressing Towards Goal  Goal: Psychosocial  Outcome: Progressing Towards Goal  Goal: *Optimal pain control at patient's stated goal  Outcome: Progressing Towards Goal  Goal: *Urinary output within identified parameters  Outcome: Progressing Towards Goal  Goal: *Hemodynamically stable  Outcome: Progressing Towards Goal  Goal: *Tolerating diet  Outcome: Progressing Towards Goal  Goal: *Lab values improving  Outcome: Progressing Towards Goal     Problem: Acute Renal Failure: Day 5  Goal: Off Pathway (Use only if patient is Off Pathway)  Outcome: Progressing Towards Goal  Goal: Activity/Safety  Outcome: Progressing Towards Goal  Goal: Diagnostic Test/Procedures  Outcome: Progressing Towards Goal  Goal: Nutrition/Diet  Outcome: Progressing Towards Goal  Goal: Discharge Planning  Outcome: Progressing Towards Goal  Goal: Medications  Outcome: Progressing Towards Goal  Goal: Respiratory  Outcome: Progressing Towards Goal  Goal: Treatments/Interventions/Procedures  Outcome: Progressing Towards Goal  Goal: Psychosocial  Outcome: Progressing Towards Goal  Goal: *Optimal pain control at patient's stated goal  Outcome: Progressing Towards Goal  Goal: *Urinary output within identified parameters  Outcome: Progressing Towards Goal  Goal: *Hemodynamically stable  Outcome: Progressing Towards Goal  Goal: *Tolerating diet  Outcome: Progressing Towards Goal  Goal: *Lab values improving  Outcome: Progressing Towards Goal     Problem: Acute Renal Failure: Day 6  Goal: Off Pathway (Use only if patient is Off Pathway)  Outcome: Progressing Towards Goal  Goal: Activity/Safety  Outcome: Progressing Towards Goal  Goal: Diagnostic Test/Procedures  Outcome: Progressing Towards Goal  Goal: Nutrition/Diet  Outcome: Progressing Towards Goal  Goal: Discharge Planning  Outcome: Progressing Towards Goal  Goal: Medications  Outcome: Progressing Towards Goal  Goal: Respiratory  Outcome: Progressing Towards Goal  Goal: Treatments/Interventions/Procedures  Outcome: Progressing Towards Goal  Goal: Psychosocial  Outcome: Progressing Towards Goal     Problem: Acute Renal Failure: Discharge Outcomes  Goal: *Optimal pain control at patient's stated goal  Outcome: Progressing Towards Goal  Goal: *Urinary output within identified parameters  Outcome: Progressing Towards Goal  Goal: *Hemodynamically stable  Outcome: Progressing Towards Goal  Goal: *Tolerating diet  Outcome: Progressing Towards Goal  Goal: *Lab values stabilized  Outcome: Progressing Towards Goal  Goal: *Verbalizes understanding and describes medication purposes and frequencies  Outcome: Progressing Towards Goal  Goal: *Medication reconciliation  Outcome: Progressing Towards Goal

## 2022-05-31 NOTE — CONSULTS
Consult Note    Date of  Admission: 5/29/2022  2:28 PM     Lionel Carpenter is a 58 y.o. male admitted for JOSÉ (acute kidney injury) ([facility] [N17.9]. Consult requested by Mert Cisneros MD    Assessment  · Dizziness: multifactorial including chronic severe hyperglycemia ( A1c 10.0 on admission) with obligatory volume contraction and weight loss. Cr 2.46 on admission now 1.51  · Has first degree AV block and RBBB (complete and incomplete) first reported 10-11 years ago. · Review of his telemetry alarms reveals no other higher forms of heart block. During this admission. There is no history suggestive of true syncope. He is better with hydration and the withholding of his BP meds    Recommendations:  From a cardiac POV the patient can be discharged and have OP follow up  Would hold diuretics and start a low dose of amlodipine.   I discussed my impressions and recommendations with the patient and his wife  Thank you for this referral.  Cheyenne Grijalva MD  Interventional Cardiology  [facility]  Subjective:  \" I became dizzy and lightheaded in the [employer] and had to sit down\"     Patient Active Problem List    Diagnosis Date Noted    Type 2 diabetes mellitus with hyperglycemia ([facility] 05/30/2022    JOSÉ (acute kidney injury) ([facility] 05/29/2022    Gallstones 02/10/2016    TIA (transient ischemic attack) 08/02/2012    Diabetes mellitus ([address] 08/02/2012    Essential hypertension, malignant 08/02/2012      Past Medical History:   Diagnosis Date    Adverse effect of anesthesia     woke up during colonoscopy and felt pressure    Arthritis     Chronic kidney disease     has one kidney; born with only one    Chronic pain     back    Diabetes ([address]     Hypertension     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COPY    HX CHOLECYSTECTOMY  02/10/2016    Laparoscopic Cholecystectomy    HX ORTHOPAEDIC      wrist cyst as teenager    HX UROLOGICAL  2008    lap sx to remove remnants of kidney (bladder knicked during this surgery and had an ileus)/second lap surgery on kidney to remove the rest of the remnants    [place] ABDOMEN SURGERY PROC UNLISTED      inguinal hernia repair as a child     [facility]  2/10/16    Laparoscopic cholecystectomy with cholangiogram, interpretation of cholangiogram      No Known Allergies          Review of Symptoms:  Constitutional: positive for malaise and light headedness  Cardiac: tachycardia, ; pertinent negatives - chest pain, syncope  Respiratory: negative for cough, sputum, hemoptysis, wheezing or dyspnea on exertion  Gastrointestinal: negative for dysphagia, odynophagia, nausea, vomiting, diarrhea and abdominal pain  Musculoskeletal:positive for muscle weakness  Neurological: positive for dizziness  Other systems reviewed and negative except as above. Physical Exam    Visit Vitals  BP (!) 157/89 (BP 1 Location: Left upper arm, BP Patient Position: At rest;Sitting)   Pulse 76   Temp 97.7 °F (36.5 °C)   Resp 16   Ht 6' (1.829 m)   Wt 102.8 kg (226 lb 10.1 oz)   SpO2 98%   BMI 30.74 kg/m²     Neck: supple, symmetrical, trachea midline, no adenopathy, thyroid: not enlarged, symmetric, no tenderness/mass/nodules, no carotid bruit and no JVD  Heart: regular rate and rhythm, S1, S2 normal, no murmur, click, rub or gallop  Lungs: clear to auscultation bilaterally, normal percussion bilaterally  Abdomen: soft, non-tender.  Bowel sounds normal. No masses,  no organomegaly  Extremities: extremities normal, atraumatic, no cyanosis or edema  Pulses: 2+ and symmetric  Neurologic: Grossly normal    Cardiographics    Telemetry: normal sinus rhythm  ECG: normal sinus rhythm, 1st degree AV block, incomplete RBBB  Echocardiogram: Not done    Recent radiology, intake/output and wt reviewed    Lab Results   Component Value Date/Time    WBC 6.6 05/29/2022 10:41 PM    HGB 13.7 05/29/2022 10:41 PM    Hemoglobin (POC) 12.2 02/10/2016 12:13 PM    HCT 38.1 05/29/2022 10:41 PM    Hematocrit (POC) 36 (L) 02/10/2016 12:13 PM    PLATELET 717 23/21/2284 10:41 PM    MCV 90.7 05/29/2022 10:41 PM     Lab Results   Component Value Date/Time    Hemoglobin A1c 10.0 (H) 05/29/2022 10:41 PM    Hemoglobin A1c 7.2 (H) 08/02/2012 09:21 AM    Glucose 268 (H) 05/31/2022 04:36 AM    Glucose (POC) 295 (H) 05/31/2022 11:14 AM    LDL, calculated 104.2 (H) 08/03/2012 03:50 AM    Creatinine (POC) 0.9 02/10/2016 12:13 PM    Creatinine 1.51 (H) 05/31/2022 04:36 AM      Lab Results   Component Value Date/Time    Sodium 137 05/31/2022 04:36 AM    Potassium 3.5 05/31/2022 04:36 AM    Chloride 103 05/31/2022 04:36 AM    CO2 26 05/31/2022 04:36 AM    Anion gap 8 05/31/2022 04:36 AM    Glucose 268 (H) 05/31/2022 04:36 AM    BUN 25 (H) 05/31/2022 04:36 AM    Creatinine 1.51 (H) 05/31/2022 04:36 AM    BUN/Creatinine ratio 17 05/31/2022 04:36 AM    GFR est AA 57 (L) 05/31/2022 04:36 AM    GFR est non-AA 47 (L) 05/31/2022 04:36 AM    Calcium 8.7 05/31/2022 04:36 AM    Bilirubin, total 1.1 (H) 05/30/2022 04:15 AM    ALT (SGPT) 32 05/29/2022 10:41 PM    Alk.  phosphatase 96 05/29/2022 10:41 PM    Protein, total 6.7 05/29/2022 10:41 PM    Albumin 3.4 (L) 05/29/2022 10:41 PM    Globulin 3.3 05/29/2022 10:41 PM    A-G Ratio 1.0 (L) 05/29/2022 10:41 PM

## 2022-05-31 NOTE — PROGRESS NOTES
Bedside shift change report given to [address]  (oncoming nurse) by Nikkie Odom  (offgoing nurse). Report included the following information SBAR, Kardex, MAR and Recent Results.

## 2022-05-31 NOTE — DISCHARGE SUMMARY
Discharge Summary       PATIENT ID: Carley Navarrete  MRN: [id number]   YOB: 1960    DATE OF ADMISSION: 5/29/2022  2:28 PM    DATE OF DISCHARGE: 5/31/22   PRIMARY CARE PROVIDER: Ankit Estrella MD     ATTENDING PHYSICIAN: Wayne Sandhoff  DISCHARGING PROVIDER: Xavier Kaiser MD    To contact this individual call [phone] and ask the  to page. If unavailable ask to be transferred the Adult Hospitalist Department. CONSULTATIONS: IP CONSULT TO CARDIOLOGY    PROCEDURES/SURGERIES: * No surgery found *    DISCHARGE DIAGNOSES:  Dizziness and JOSÉ  From polypharmacy for BP     Dizziness in the setting of multiple BP meds and hypotension with JOSÉ     -- Hold ACE/ Chlorthalidone and BB   - -resumed BB at d/c  -- out pt stress test, ECHO normal      Back pain  -- on tramadol 300 and dereck 900 bid  -- probably contributed as well  -- d/w pt    DM - cont long acting and SSI    ADMISSION SUMMARY AND HOSPITAL COURSE:       DISCHARGE DIAGNOSES / PLAN:      D/c home    BMI: Body mass index is 30.74 kg/m². . This patient: Meets criteria for obesity given BMI >/= 30 and < 40 due to excess calories/nutritional. Weight loss and lifestyle modifications should be encouraged as an outpatient. PENDING TEST RESULTS:   At the time of discharge the following test results are still pending:      ADDITIONAL CARE RECOMMENDATIONS:        NOTIFY YOUR PHYSICIAN FOR ANY OF THE FOLLOWING:   Fever over 101 degrees for 24 hours. Chest pain, shortness of breath, fever, chills, nausea, vomiting, diarrhea, change in mentation, falling, weakness, bleeding. Severe pain or pain not relieved by medications, as well as any other signs or symptoms that you may have questions about.     FOLLOW UP APPOINTMENTS:    Follow-up Information     Follow up With Specialties Details Why Contact Info    Ankit Estrella MD Family Medicine In 1 week  [address] [facility] [address] [place] [address]  [phone]               DIET: Cardiac Diet and Diabetic Diet    ACTIVITY: Activity as tolerated    EQUIPMENT needed:     DISCHARGE MEDICATIONS:  Current Discharge Medication List      CONTINUE these medications which have NOT CHANGED    Details   cyclobenzaprine (FLEXERIL) 10 mg tablet Take 10-20 mg by mouth as needed for Muscle Spasm(s). dulaglutide (Trulicity) 1.5 UJ/4.6 mL sub-q pen 1.5 mg by SubCUTAneous route every seven (7) days. insulin glargine,hum.rec.anlog (BASAGLAR KWIKPEN U-100 INSULIN SC) 100 Units by SubCUTAneous route daily. metFORMIN ER (glucophage XR) 500 mg tablet Take 500 mg by mouth two (2) times a day. acetaminophen (TYLENOL ARTHRITIS PO) Take  by mouth as needed. bisoprolol (ZEBETA) 5 mg tablet Take 5 mg by mouth daily. atorvastatin (LIPITOR) 10 mg tablet Take 10 mg by mouth nightly.      gabapentin (NEURONTIN) 300 mg capsule Take 900 mg by mouth two (2) times a day. HYDROcodone-acetaminophen (NORCO) 7.5-325 mg per tablet Take 1 Tab by mouth three (3) times daily as needed. Tramadol (RYZOLT) 300 mg TM24 Take 300 mg by mouth daily. STOP taking these medications       chlorthalidone (HYGROTEN) 25 mg tablet Comments:   Reason for Stopping:         valsartan (Diovan) 320 mg tablet Comments:   Reason for Stopping:               DISPOSITION:   x Home With:   OT  PT  HH  RN       Long term SNF/Inpatient Rehab    Independent/assisted living    Hospice    Other:       PATIENT CONDITION AT DISCHARGE:     Functional status    Poor     Deconditioned    x Independent      Cognition   x  Lucid     Forgetful     Dementia      Catheters/lines (plus indication)    Mcclellan     PICC     PEG    x None      Code status    x Full code     DNR      PHYSICAL EXAMINATION AT DISCHARGE:  General:          Alert, cooperative, no distress, appears stated age.      HEENT:           Atraumatic, anicteric sclerae, pink conjunctivae                          No oral ulcers, mucosa moist, throat clear, dentition fair  Neck:               Supple, symmetrical  Lungs:             Clear to auscultation bilaterally. No Wheezing or Rhonchi. No rales. Chest wall:      No tenderness  No Accessory muscle use. Heart:              Regular  rhythm,  No  murmur   No edema  Abdomen:        Soft, non-tender. Not distended. Bowel sounds normal  Extremities:     No cyanosis. No clubbing,                            Skin turgor normal, Capillary refill normal  Skin:                Not pale. Not Jaundiced  No rashes   Psych:             Not anxious or agitated.   Neurologic:      Alert, moves all extremities, answers questions appropriately and responds to commands       CHRONIC MEDICAL DIAGNOSES:  Problem List as of 5/31/2022 Date Reviewed: 5/30/2022          Codes Class Noted - Resolved    Type 2 diabetes mellitus with hyperglycemia ([facility] ICD-10-CM: E11.65  ICD-9-CM: 250.00  5/30/2022 - Present        JOSÉ (acute kidney injury) ([facility] ICD-10-CM: N17.9  ICD-9-CM: 584.9  5/29/2022 - Present        Gallstones ICD-10-CM: K80.20  ICD-9-CM: 574.20  2/10/2016 - Present        TIA (transient ischemic attack) ICD-10-CM: G45.9  ICD-9-CM: 435.9  8/2/2012 - Present        Diabetes mellitus ([facility] ICD-10-CM: E11.9  ICD-9-CM: 250.00  8/2/2012 - Present        Essential hypertension, malignant ICD-10-CM: I10  ICD-9-CM: 401.0  8/2/2012 - Present        RESOLVED: Hypertension ICD-10-CM: I10  ICD-9-CM: 401.9  8/2/2012 - 8/3/2021              Greater than 30  minutes were spent with the patient on counseling and coordination of care    Signed:   Tato Scott MD  5/31/2022  1:18 PM

## 2022-06-13 ENCOUNTER — CLINICAL SUPPORT (OUTPATIENT)
Dept: DIABETES SERVICES | Age: 62
End: 2022-06-13
Payer: MEDICARE

## 2022-06-13 DIAGNOSIS — Z79.4 TYPE 2 DIABETES MELLITUS WITH HYPERGLYCEMIA, WITH LONG-TERM CURRENT USE OF INSULIN (HCC): ICD-10-CM

## 2022-06-13 DIAGNOSIS — E11.65 TYPE 2 DIABETES MELLITUS WITH HYPERGLYCEMIA, WITH LONG-TERM CURRENT USE OF INSULIN (HCC): ICD-10-CM

## 2022-06-13 PROCEDURE — G0108 DIAB MANAGE TRN  PER INDIV: HCPCS | Performed by: DIETITIAN, REGISTERED

## 2022-06-13 NOTE — PROGRESS NOTES
[facility] Program for Diabetes Health  Diabetes Self-Management Education & Support Program    Reason for Referral: T2DM  Referral Source: Belinda Chowdary MD  Services requested: DSMES, MNT       ASSESSMENT    From my perspective, the participant would benefit from [facility] specifically related to reducing risks, healthy eating, monitoring, taking medications, physical activity, healthy coping and problem solving. Will adapt DSMES program to build on participant's skills score and confidence score as noted in the Diabetes Skills, Confidence, and Preparedness Index. During the program, we will focus on providing DSMES that specifically addresses participant's interest in healthy eating and taking medications, as shown by their reported readiness to change. The participant would be best served by attending weekly group class series. Diabetes Self-Management Education Follow-up Visit: 7/05/22       Clinical Presentation  Elizabeth Matthew is a 58 y.o. White male referred for diabetes self-management education. Participant has Type 2 DM on insulin for 1-10 years. Family history negative for diabetes. Patient reports not receiving DSMES services in the past. Most recent A1c value:   Lab Results   Component Value Date/Time    Hemoglobin A1c 10.0 (H) 05/29/2022 10:41 PM       Diabetes-related medical history:  Neurological complications  peripheral neuropathy    Diabetes-related medications:  Current dosing:   Key Antihyperglycemic Medications             dulaglutide (Trulicity) 1.5 UD/9.3 mL sub-q pen 1.5 mg by SubCUTAneous route every seven (7) days. insulin glargine,hum.rec.anlog (BASAGLAR KWIKPEN U-100 INSULIN SC) 100 Units by SubCUTAneous route daily. metFORMIN ER (glucophage XR) 500 mg tablet Take 500 mg by mouth two (2) times a day. Blood Pressure Management  Key ACE/ARB Medications     Patient is on no ACE or ARB meds.           Lipid Management  Key Antihyperlipidemia Meds atorvastatin (LIPITOR) 10 mg tablet Take 10 mg by mouth nightly. Clot Prevention  Key Anti-Platelet Anticoagulant Meds     The patient is on no antiplatelet meds or anticoagulants. Learning Assessment  Learning objectives Educator assessment (6/13/2022)   Diabetes Disease Process  The participant can   A) describe diabetes in basic terms;   B) state the type of diabetes they have; &   C) state accepted blood glucose targets. Healthy Eating  The participant can   A) identify carbohydrate foods; &   B) accurately read food labels. Being Active  The participant can  A) state the benefits of physical activity;  B) report their current PA practices;  C) identify PA they would consider incorporating in their lives; &  D) develop an implementation plan. Monitoring  The participant can  A) operate their blood glucose meter; &  B) describe how they log their blood glucoses to share with their provider. Taking Medications  The participant can  A) name their diabetes medications;  B) state the purpose and dose;  C) note side effects; &  D) describe proper storage, disposal & transport (if appropriate). Healthy Coping  The participant can    A) describe their response to diabetes diagnosis; B) describe their specific coping mechanisms;  C) identify supportive people and/or other resources that positively support their diabetes self-care and health. Reducing Risks  The participant can describe the preventive measures used by providers to promote health and prevent diabetes complications. Problem Solving  The participant can   A) identify signs, symptoms & treatment of hypoglycemia;    B) identify signs, symptoms & treatment of hyperglycemia;  C) describe their sick day plan; &  D) identify BG patterns to discuss with their provider.        No  Yes  Yes        Yes  No        Yes  No  No  No        Yes  No        No   No  Yes  No, identified proper storage and transport. Yes  Yes, identified setting boundaries. Yes        No, identified a foot exam.          No  No  No  No     Characteristics to Learning   Barriers to Learning      None, schedule     Favorite Ways to Learn   [] Lecture  [] Slides  [x] Reading [x] Video-Internet  [] Cassettes/CDs/MP3's  [] Interactive Small Groups [] Other       Behavioral Assessment  Current self-care practices  Educator assessment (6/13/2022)   Healthy Eating   Current practices    24-hour Dietary Recall:  Breakfast: None  Lunch: Frosted cereal (3 cups), 2% milk (1.5 cups)  Dinner: Steak, fried squash, mini croissant rolls (x1.5)  Snacks: Cashews, gelato (before dinner- 1/3 pint and after dinner- 2/3 pint)  Beverages: Diet sweet tea, water  Alcohol: None    *Participant reported recently focusing on managing portions. Would benefit from [facility] related to Healthy Eating: Yes    Eats a carbohydrate controlled diet: No    Stage of change: Action      Being Active  Current practices  How many days during the past week have you performed physical activity where your heart beats faster and your breathing is harder than normal for 30 minutes or more?  0 day(s)    How many days in a typical week do you perform activity such as this?  0 day(s)    *Participant shared he would like to go to the [facility] though cannot fit it into his schedule. Would benefit from [facility] related to Being Active: Yes      Exercises 150 minutes/week: No      Stage of change: Contemplation     Monitoring  Current practices  Do you monitor your blood sugar? No, participant shared he recently obtained supplies for SMBG. How often do you monitor? never    What are the range of readings? Participant shared he is not currently monitoring blood glucose. Do you know your last A1c measurement? No    Do you know the meaning of the A1c?  No     Would benefit from [facility] related to Monitoring: Yes      Uses BG readings to establish trends and understand BG patterns: No      Stage of change: Preparation       Taking Medication  Current practices  Do you understand what your diabetes medications do? No    How often do you miss doses of your diabetes medications? 3x/week, typically insulin    Can you afford your diabetes medications? Yes   Would benefit from [facility] related to Taking Medication: Yes      Takes medications consistently to receive full benefit: No      Stage of change: Action       Healthy Coping   Current state  Diabetes Skills, Confidence and Preparedness Index: Total score: 5  Skills: 4.8  Confidence: 4.3  Preparedness: 6.1       Would benefit from DSMES related to Healthy Coping: Yes      Identifies specific people, organizations,etc, that actively support their diabetes self-care efforts: Yes, healthcare provider      Stage of change: Action     Reducing Risks  Current state  Vaccines:  Influenza: Participant shared he has received this vaccine within the past one year. Immunization History   Administered Date(s) Administered    Influenza Vaccine 10/10/2015       Pneumococcal:   Immunization History   Administered Date(s) Administered    (RETIRED) Pneumococcal Vaccine (Unspecified Type) 08/02/2012        Hepatitis: There is no immunization history for the selected administration types on file for this patient. Examinations:  Dilated eye exam: last appointment was: 4/2022 per participant report    Dental exam: last appointment was: within the past 6 months per participant report    Foot exam: done on: 5/16/22 per participant report    Heart Protection:  BP Readings from Last 2 Encounters:   05/31/22 (!) 157/89   11/05/20 122/72        Lab Results   Component Value Date/Time    LDL, calculated 104.2 (H) 08/03/2012 03:50 AM        Kidney Protection:  No results found for: ALEJANDRA Hicks, Jon 88, MCAU, MCAU2, MCALPOCT     Would benefit from [facility] related to Reducing Risks:  Yes      Actively participates in decision-making with provider regarding secondary prevention:  Yes      Stage of change: Action   Problem Solving  Current state  Hypoglycemia Management:  What are signs and symptoms of hypoglycemia that you experience: Participant shared he has not experienced hypoglycemia. How do you prevent hypoglycemia: consistent meals/snack time    How do you treat hypoglycemia: Have some glucose tablets, unsure how much    Hyperglycemia Management:  What are signs and symptoms of hyperglycemia that you experience: Pt reported being unaware of s/s of hyperglycemia    How can you prevent hyperglycemia: Focus on managing portions of CHO    Sick Day Management:  What do you do differently on sick days:  Pt reported being unaware of self-management on sick days    Pattern Management:  Do you notice blood glucose patterns when you look at the readings in your meter or logbook? No    How do you use the blood glucose readings from your meter or logbook? Participant shared he is not currently monitoring BGs. Would benefit from [facility] related to Problem Solving: Yes      Articulates appropriate strategies to address hypoglycemia, hyperglycemia, sick day care and BG pattern: No, participant identified appropriate strategies for preventing hypoglycemia and hyperglycemia. Stage of change: Action       Note: Content derived from the American Association of Diabetes Educators' Diabetes Education Curriculum: A Guide to Successful Self-Management (3rd edition)      Gloria Howard MS, RDN on 6/13/2022 at 10:38 AM    I have personally reviewed the health record, including provider notes, laboratory data and current medications before making these care and education recommendations. Total minutes: 53 minutes    Additional Data for SCPI:  Diabetes Skills, Confidence & Preparedness Index (SCPI) ©  All scales and questions are out of 7.     Overall SCPI score: 5.0 Skills Score: 4.8  Low: Blood Sugar Monitoring(Q8) Confidence Score: 4.3  Low: Healthy Eating(Q1),Blood Sugar Monitoring(Q6),Problem [id number]) Preparedness Score: 6.1  Low: Healthy Eating(Q1),Being Active(Q2),Healthy Coping(Q3),Reducing Risks(Q4),Blood Sugar Monitoring(Q6),Problem Solving(Q7)   Healthy Eating Score: 4.0  Low: Skills(Q1),Confidence(Q1) Taking Medication Score: 6.5  Low: Skills(Q2) Blood Sugar Monitoring Score: 4.0  Low: Skills(Q8) Reducing Risks Score: 5.8  Low: Confidence(Q3)   Problem Solving Score: 4.3  Low: Confidence(Q7) Healthy Coping Score: 6.3  Low: Skills(Q7),Preparedness(Q3) Being Active Score: 6.0  Low: Confidence(Q5),Preparedness(Q2)     Skills/Knowledge Questions   1. I know how to plan meals that have the best balance between carbohydrates, proteins and vegetables. 2   2. I know how my diabetes medications (pills, injectables and/or insulin) work in my body. 6   3. I know when to check my blood sugar if I want to see how my body responded to a meal. 5   4. I know when to check my blood sugars to determine if my medication or insulin doses are correct. 6   5. I know what to do to prevent a low blood sugar when I exercise (either before, during, or after). 6   6. When I am sick, I know what to do differently with my diabetes management. 5   7. I know how stress can affect my diabetes management. 6   8. When I look at my blood sugars over a given week, I can explain what my blood sugar pattern is. 1   9. I know what my target levels are for A1c, blood pressure and cholesterol. 6   Confidence Questions   1. I am confident that I can plan balanced meals and snacks. 2   2. I am confident that I can manage my stress. 7   3. I am confident that I can prevent a low blood sugar during or after exercise. 5   4. I am confident that the next time I eat out, I will be able to choose foods that best keep my blood sugars in target. 6   5. I am confident I can include exercise into my schedule. 6   6. I am confident that I can use my daily blood sugars to adjust my diet, my activity, and/or my insulin. 2   7. When something out of my normal routine happens, I am confident that I can problem-solve and keep my diabetes on track. 2   Preparedness Questions   1. Within the next month, I will begin to eat more balanced meals and snacks. 6   2. Within the next month, I will choose an exercise activity and I will start fitting it into my schedule. 6   3. Within the next month, I will make a list of stress management options that work for me. 6   4. Within the next month, I will consistently plan ahead to prevent low blood sugars. 6   5. Within the next month, I will start adjusting my insulin doses on my own. 8   6. Within the next month, I will begin making changes to my diabetes management based on my daily blood sugars (eg - eating, activity and/or insulin). 6   7. Within the next month, I will begin making changes to my diabetes management to meet my overall goals (eg - eating, activity and/or insulin).  6

## 2022-07-05 ENCOUNTER — CLINICAL SUPPORT (OUTPATIENT)
Dept: DIABETES SERVICES | Age: 62
End: 2022-07-05
Payer: MEDICARE

## 2022-07-05 DIAGNOSIS — Z79.4 TYPE 2 DIABETES MELLITUS WITH HYPERGLYCEMIA, WITH LONG-TERM CURRENT USE OF INSULIN (HCC): Primary | ICD-10-CM

## 2022-07-05 DIAGNOSIS — E11.65 TYPE 2 DIABETES MELLITUS WITH HYPERGLYCEMIA, WITH LONG-TERM CURRENT USE OF INSULIN (HCC): Primary | ICD-10-CM

## 2022-07-05 PROCEDURE — G0109 DIAB MANAGE TRN IND/GROUP: HCPCS | Performed by: DIETITIAN, REGISTERED

## 2022-07-06 NOTE — PROGRESS NOTES
[address] [place] [address] for Diabetes Health  Diabetes Self-Management Education & Support Program  Encounter Note    SUMMARY  Diabetes self-care management training was completed related to reducing risks. The participant plans to call to schedule next DSMES related to healthy eating and monitoring. The participant did identify SMART Goal(s) and will practice knowledge and skills related to reducing risks to improve diabetes self-management. EVALUATION:  Participant shared his wife supports him in caring for his diabetes health, and shared he currently monitors his BG <1x/week and he would like to focus on monitoring his BG more regularly. Participant arrived late to [facility] visit, shared he is unable to attend future group classes d/t a schedule conflict during the class times, and has plans to call to schedule future visits. RECOMMENDATIONS:  Encouraged participant to focus on SMART goal, consider using a personal care record to keep track of diabetes health    TOPICS DISCUSSED TODAY:  WHAT IS DIABETES? Minutes: Sol Sellers 58? 60 minutes      Next provider visit is scheduled for: not scheduled in [facility]       SMART GOAL(S)  1. Monitor my blood glucose 3 days/week. (Goal set 7/05/22)  ACHIEVEMENT OF GOAL(S) : Goal set during class         DATE DSMES TOPIC EVALUATION     7/5/2022   WHAT IS DIABETES?   a. Role of the normal pancreas in energy balance and blood glucose control   b. The defect seen in diabetes   c. Signs & symptoms of diabetes   d. Diagnosis of diabetes   e. Types of diabetes   f. Blood glucose targets in non-pregnant adults       The participant knows   Their type of diabetes: Yes   The basic physiologic defect: Yes   Blood glucose targets: Yes     DATE DSMES TOPIC EVALUATION     7/5/2022   HOW DO I STAY HEALTHY?   a. Prevention    Vaccinations   b.  Examinations    Eye     Foot   c. Diabetic complications' prevention   [address] [facility]    Sleep health      The participant has a personal diabetes care record to keep abreast of diabetes health No     The participant plans to address: monitoring BG more regularly. Ade Walker MS, RDN on 7/6/2022 at 9:01 AM    I have personally reviewed the health record, including provider notes, laboratory data and current medications before making these care and education recommendations.   Total minutes: 115 minutes

## 2022-07-12 ENCOUNTER — CLINICAL SUPPORT (OUTPATIENT)
Dept: DIABETES SERVICES | Age: 62
End: 2022-07-12
Payer: MEDICARE

## 2022-07-12 DIAGNOSIS — Z79.4 TYPE 2 DIABETES MELLITUS WITH HYPERGLYCEMIA, WITH LONG-TERM CURRENT USE OF INSULIN (HCC): Primary | ICD-10-CM

## 2022-07-12 DIAGNOSIS — E11.65 TYPE 2 DIABETES MELLITUS WITH HYPERGLYCEMIA, WITH LONG-TERM CURRENT USE OF INSULIN (HCC): Primary | ICD-10-CM

## 2022-07-12 PROCEDURE — G0109 DIAB MANAGE TRN IND/GROUP: HCPCS | Performed by: DIETITIAN, REGISTERED

## 2022-07-14 NOTE — PROGRESS NOTES
[address] [place] [address] for Diabetes Health  Diabetes Self-Management Education & Support Program  Encounter Note    SUMMARY  Diabetes self-care management training was completed related to healthy eating and monitoring. The participant will return on July 19 to continue DSMES related to taking medications and physical activity. The participant did not identify SMART Goal(s) and will practice knowledge and skills related to monitoring to improve diabetes self-management. EVALUATION:  Participant demonstrated understanding of building balanced meals using the healthy plate as a guide and reading nutrition facts labels. Participant shared he monitored his BG one time over the past week, and he has plans to continue focusing on his SMART goal. Participant attended visit with his family for support, and shared his schedule changed and he believes he will be able to continue attending group classes. RECOMMENDATIONS:  Encouraged participant to continue focusing on SMART goal, practice building balanced meals     TOPICS DISCUSSED TODAY:  WHAT CAN I EAT? 60  HOW CAN BLOOD GLUCOSE MONITORING HELP ME? 60 minutes      Next provider visit is scheduled for: not scheduled in [facility]       SMART GOAL(S)  1. Monitor my blood glucose 3 days/week. (Goal set 7/05/22)  ACHIEVEMENT OF GOAL(S) : 25-49%       DATE DSMES TOPIC EVALUATION     7/12/2022   WHAT CAN I EAT?   a. General principles   b. Determining a healthy weight   c. Nutritional terms & tools    Healthy Plate method    Carbohydrate Counting    Reading food labels    Free apps      The participant    Uses Healthy Plate principles in constructing meals: Yes   Reads food labels in choosing acceptable foods: Yes    The participant plans to address: practicing building balanced meals. DATE DSMES TOPIC EVALUATION     7/12/2022   HOW CAN BLOOD GLUCOSE MONITORING HELP ME?   a. Value of blood glucose monitoring   b.  Realistic expectations   c. Blood glucose monitoring targets   d. Target adjustments   e. Setting a1c & blood glucose targets with provider   f. Meter selection    g. Technique for obtaining blood droplet   h. Blood glucose testing sites   i. Determining best times to test   k. Data sharing with provider        The participant    Can demonstrate their glucometer procedure: Participant expressed understanding.  Logs their BG readings:  No    The participant plans to address: monitoring BG more consistently. Jeaneth Bach RD on 7/14/2022 at 1:40 PM    I have personally reviewed the health record, including provider notes, laboratory data and current medications before making these care and education recommendations.   Total minutes: 120 minutes

## 2022-07-19 ENCOUNTER — CLINICAL SUPPORT (OUTPATIENT)
Dept: DIABETES SERVICES | Age: 62
End: 2022-07-19
Payer: MEDICARE

## 2022-07-19 DIAGNOSIS — Z79.4 TYPE 2 DIABETES MELLITUS WITH HYPERGLYCEMIA, WITH LONG-TERM CURRENT USE OF INSULIN (HCC): Primary | ICD-10-CM

## 2022-07-19 DIAGNOSIS — E11.65 TYPE 2 DIABETES MELLITUS WITH HYPERGLYCEMIA, WITH LONG-TERM CURRENT USE OF INSULIN (HCC): Primary | ICD-10-CM

## 2022-07-19 PROCEDURE — G0109 DIAB MANAGE TRN IND/GROUP: HCPCS | Performed by: DIETITIAN, REGISTERED

## 2022-07-20 NOTE — PROGRESS NOTES
[employer] Program for Diabetes Health  Diabetes Self-Management Education & Support Program  Encounter Note    SUMMARY  Diabetes self-care management training was completed related to taking medications and physical activity. The participant will return on July 26 to continue DSMES related to healthy coping and problem solving. The participant did not identify SMART Goal(s) and will practice knowledge and skills related to monitoring to improve diabetes self-management. EVALUATION:  Participant attended visit with his family for support. Participant expressed understanding of the expected action and side effects of his trulicity, basaglar, and metformin, the benefits of physical activity, and barriers and facilitators r/t physical activity and taking medications. Participant shared he monitored BG 3x over the past two days, reported BG readings of 80-96 mg/dL, and shared he has plans to continue focusing on monitoring BG regularly. Participant's wife shared he has also been drinking less soda. RECOMMENDATIONS:  Encouraged participant to continue focusing on SMART goal    TOPICS DISCUSSED TODAY:  HOW DO [address] AFFECT MY DIABETES? 60 minutes      Next provider visit is scheduled for: not scheduled in [facility]       SMART GOAL(S)   Monitor my blood glucose 3 days/week. (Goal set 7/05/22)  ACHIEVEMENT OF GOAL(S) : 50-74%       DATE DSMES TOPIC EVALUATION     7/19/2022   HOW DO MY DIABETES MEDICATIONS WORK?    Type 2 medications & methods   Insulin injections   Injection sites   Oral agents   GLP-1 agonists   Hypoglycemia symptoms & treatment   Glucagon emergency kits   General guidance regarding insulin use whether Type 1, 2 or gestational diabetes   Storage of insulin   Disposal    Traveling with medications   Barriers to medication adherence      The participant   Can describe the expected action & side effects of prescribed diabetes medications: Yes  Can demonstrate injection technique (if applicable): No, participant expressed understanding and did not want to demonstrate technique. The participant plans to address:      DATE DSMES TOPIC EVALUATION     7/19/2022   HOW DOES PHYSICAL ACTIVITY AFFECT MY DIABETES? Benefits of physical activity   Beginning a program of physical activity   Walking   Pedometers   Goal setting   Structured physical activity program   Aerobic activity   Resistance   Flexibility   Balance   Physical activity program progression   Safety issues   Barriers to physical activity   Facilitators of physical activity        The participant has established a regular physical activity plan: No    The participant plans to address: identifying physical activity he enjoys. Judy Griffin MS, RDN on 7/20/2022 at 11:09 AM    I have personally reviewed the health record, including provider notes, laboratory data and current medications before making these care and education recommendations.   Total minutes: 130 minutes

## 2022-07-26 ENCOUNTER — CLINICAL SUPPORT (OUTPATIENT)
Dept: DIABETES SERVICES | Age: 62
End: 2022-07-26
Payer: MEDICARE

## 2022-07-26 DIAGNOSIS — E11.65 TYPE 2 DIABETES MELLITUS WITH HYPERGLYCEMIA, WITH LONG-TERM CURRENT USE OF INSULIN (HCC): Primary | ICD-10-CM

## 2022-07-26 DIAGNOSIS — Z79.4 TYPE 2 DIABETES MELLITUS WITH HYPERGLYCEMIA, WITH LONG-TERM CURRENT USE OF INSULIN (HCC): Primary | ICD-10-CM

## 2022-07-26 PROCEDURE — G0109 DIAB MANAGE TRN IND/GROUP: HCPCS

## 2022-07-29 NOTE — PROGRESS NOTES
[employer] Program for Diabetes Health  Diabetes Self-Management Education & Support Program  Encounter Note    SUMMARY  Diabetes self-care management training was completed related to healthy coping and problem solving was started. The participant will return in September 2022 for post program evaluation. The participant did not identify new SMART Goal(s) and will practice knowledge and skills related to reducing risks, healthy eating and monitoring, being active and medications, and healthy coping and problem solving to improve diabetes self-management. EVALUATION:  Participant shared he feels supported by his wife and providers in his diabetes self management. Mr Erich Baxter plans to use [employer] and [employer] [place] as part of his diabetes self management support plan. He expressed understanding of using problem solving strategies to overcome barriers to diabetes self management and shared he feels confident in his knowledge about diabetes & his developing diabetes self management skills. Mr Erich Baxter had a medical appointment and left around 3:45 pm. Will contact to schedule next appointment. RECOMMENDATIONS:  Focus on SMART goal of monitoring BG 3 times a week to share with provider. TOPICS DISCUSSED TODAY:  Healthy Coping : 60 minutes  Problem solving : 15 minutes         SMART GOAL(S)   Monitor BG 3 days a week. ACHIEVEMENT OF GOAL(S) : %             DATE DSMES TOPIC EVALUATION     7/29/2022 HOW DO I FIND SUPPORT TO TACKLE THIS CONDITION?    Normal responses to diabetes diagnosis or complication   Shock   Anger & resentment   Guilt/self-blame   Sadness & worry   Depression    Anxiety   Pregnancy   Constructive strategies to normal responses    Exploring feelings & attitudes   Motivation: Cost versus benefits analysis   Problem-solving: Chain analysis   Obtaining support: Communicating   Family & friends   Health team   Community   Stress   Symptoms   Managing stress   Fill your tool kit The participant can identify people that support them in caring for their diabetes health: patient, spouse, and providers. The participant would like to pursue the following in keeping themselves healthy after completing the program:  [employer] and [address]    The participant needs to address consistently tracking self care practices to include BG, meals & exercise. Achille Duverney, RN on 7/29/2022 at 9:53 AM    I have personally reviewed the health record, including provider notes, laboratory data and current medications before making these care and education recommendations. The time spent in this effort is included in the total time.   Total minutes: 75

## 2023-02-16 ENCOUNTER — APPOINTMENT (OUTPATIENT)
Dept: CT IMAGING | Age: 63
End: 2023-02-16
Attending: EMERGENCY MEDICINE
Payer: MEDICARE

## 2023-02-16 ENCOUNTER — HOSPITAL ENCOUNTER (EMERGENCY)
Age: 63
Discharge: HOME | End: 2023-02-16
Attending: EMERGENCY MEDICINE
Payer: MEDICARE

## 2023-02-16 VITALS
TEMPERATURE: 97.9 F | HEIGHT: 72 IN | BODY MASS INDEX: 30.85 KG/M2 | HEART RATE: 75 BPM | OXYGEN SATURATION: 97 % | RESPIRATION RATE: 20 BRPM | SYSTOLIC BLOOD PRESSURE: 180 MMHG | WEIGHT: 227.74 LBS | DIASTOLIC BLOOD PRESSURE: 93 MMHG

## 2023-02-16 DIAGNOSIS — R06.2 WHEEZING: Primary | ICD-10-CM

## 2023-02-16 LAB
ALBUMIN SERPL-MCNC: 2.8 G/DL (ref 3.5–5)
ALBUMIN/GLOB SERPL: 0.7 (ref 1.1–2.2)
ALP SERPL-CCNC: 72 U/L (ref 45–117)
ALT SERPL-CCNC: 36 U/L (ref 12–78)
ANION GAP SERPL CALC-SCNC: 5 MMOL/L (ref 5–15)
AST SERPL-CCNC: 29 U/L (ref 15–37)
BASOPHILS # BLD: 0 K/UL (ref 0–0.1)
BASOPHILS NFR BLD: 0 % (ref 0–1)
BILIRUB SERPL-MCNC: 1.3 MG/DL (ref 0.2–1)
BNP SERPL-MCNC: 232 PG/ML
BUN SERPL-MCNC: 20 MG/DL (ref 6–20)
BUN/CREAT SERPL: 14 (ref 12–20)
CALCIUM SERPL-MCNC: 8.6 MG/DL (ref 8.5–10.1)
CHLORIDE SERPL-SCNC: 106 MMOL/L (ref 97–108)
CO2 SERPL-SCNC: 31 MMOL/L (ref 21–32)
CREAT SERPL-MCNC: 1.38 MG/DL (ref 0.7–1.3)
DIFFERENTIAL METHOD BLD: ABNORMAL
EOSINOPHIL # BLD: 0.3 K/UL (ref 0–0.4)
EOSINOPHIL NFR BLD: 3 % (ref 0–7)
ERYTHROCYTE [DISTWIDTH] IN BLOOD BY AUTOMATED COUNT: 12.3 % (ref 11.5–14.5)
GLOBULIN SER CALC-MCNC: 3.9 G/DL (ref 2–4)
GLUCOSE SERPL-MCNC: 151 MG/DL (ref 65–100)
HCT VFR BLD AUTO: 37.5 % (ref 36.6–50.3)
HGB BLD-MCNC: 12.9 G/DL (ref 12.1–17)
IMM GRANULOCYTES # BLD AUTO: 0 K/UL (ref 0–0.04)
IMM GRANULOCYTES NFR BLD AUTO: 0 % (ref 0–0.5)
LYMPHOCYTES # BLD: 2 K/UL (ref 0.8–3.5)
LYMPHOCYTES NFR BLD: 22 % (ref 12–49)
MCH RBC QN AUTO: 31.6 PG (ref 26–34)
MCHC RBC AUTO-ENTMCNC: 34.4 G/DL (ref 30–36.5)
MCV RBC AUTO: 91.9 FL (ref 80–99)
MONOCYTES # BLD: 0.6 K/UL (ref 0–1)
MONOCYTES NFR BLD: 6 % (ref 5–13)
NEUTS SEG # BLD: 6.2 K/UL (ref 1.8–8)
NEUTS SEG NFR BLD: 69 % (ref 32–75)
NRBC # BLD: 0 K/UL (ref 0–0.01)
NRBC BLD-RTO: 0 PER 100 WBC
PLATELET # BLD AUTO: 275 K/UL (ref 150–400)
PMV BLD AUTO: 9.9 FL (ref 8.9–12.9)
POTASSIUM SERPL-SCNC: 3.2 MMOL/L (ref 3.5–5.1)
PROT SERPL-MCNC: 6.7 G/DL (ref 6.4–8.2)
RBC # BLD AUTO: 4.08 M/UL (ref 4.1–5.7)
SODIUM SERPL-SCNC: 142 MMOL/L (ref 136–145)
TROPONIN I SERPL HS-MCNC: 17 NG/L (ref 0–76)
WBC # BLD AUTO: 9.1 K/UL (ref 4.1–11.1)

## 2023-02-16 PROCEDURE — 74011250636 HC RX REV CODE- 250/636: Performed by: EMERGENCY MEDICINE

## 2023-02-16 PROCEDURE — 74011000250 HC RX REV CODE- 250: Performed by: EMERGENCY MEDICINE

## 2023-02-16 PROCEDURE — 94640 AIRWAY INHALATION TREATMENT: CPT

## 2023-02-16 PROCEDURE — 74011000636 HC RX REV CODE- 636: Performed by: EMERGENCY MEDICINE

## 2023-02-16 PROCEDURE — 36415 COLL VENOUS BLD VENIPUNCTURE: CPT

## 2023-02-16 PROCEDURE — 99285 EMERGENCY DEPT VISIT HI MDM: CPT

## 2023-02-16 PROCEDURE — 71275 CT ANGIOGRAPHY CHEST: CPT

## 2023-02-16 PROCEDURE — 83880 ASSAY OF NATRIURETIC PEPTIDE: CPT

## 2023-02-16 PROCEDURE — 84484 ASSAY OF TROPONIN QUANT: CPT

## 2023-02-16 PROCEDURE — 96374 THER/PROPH/DIAG INJ IV PUSH: CPT

## 2023-02-16 PROCEDURE — 80053 COMPREHEN METABOLIC PANEL: CPT

## 2023-02-16 PROCEDURE — 85025 COMPLETE CBC W/AUTO DIFF WBC: CPT

## 2023-02-16 RX ORDER — PREDNISONE 20 MG/1
40 TABLET ORAL DAILY
Qty: 10 TABLET | Refills: 0 | Status: SHIPPED | OUTPATIENT
Start: 2023-02-16 | End: 2023-02-16 | Stop reason: SDUPTHER

## 2023-02-16 RX ORDER — IPRATROPIUM BROMIDE AND ALBUTEROL SULFATE 2.5; .5 MG/3ML; MG/3ML
3 SOLUTION RESPIRATORY (INHALATION)
Status: COMPLETED | OUTPATIENT
Start: 2023-02-16 | End: 2023-02-16

## 2023-02-16 RX ORDER — ALBUTEROL SULFATE 90 UG/1
2 AEROSOL, METERED RESPIRATORY (INHALATION)
Qty: 18 G | Refills: 0 | Status: SHIPPED | OUTPATIENT
Start: 2023-02-16 | End: 2023-02-16 | Stop reason: SDUPTHER

## 2023-02-16 RX ORDER — PREDNISONE 20 MG/1
40 TABLET ORAL DAILY
Qty: 10 TABLET | Refills: 0 | Status: SHIPPED | OUTPATIENT
Start: 2023-02-16 | End: 2023-02-21

## 2023-02-16 RX ORDER — NEBULIZER AND COMPRESSOR
1 EACH MISCELLANEOUS
Qty: 1 EACH | Refills: 0 | Status: SHIPPED | OUTPATIENT
Start: 2023-02-17 | End: 2023-02-17

## 2023-02-16 RX ORDER — IPRATROPIUM BROMIDE AND ALBUTEROL SULFATE 2.5; .5 MG/3ML; MG/3ML
3 SOLUTION RESPIRATORY (INHALATION)
Qty: 20 EACH | Refills: 0 | Status: SHIPPED | OUTPATIENT
Start: 2023-02-16 | End: 2023-02-16 | Stop reason: SDUPTHER

## 2023-02-16 RX ORDER — ALBUTEROL SULFATE 90 UG/1
2 AEROSOL, METERED RESPIRATORY (INHALATION)
Qty: 18 G | Refills: 0 | Status: SHIPPED | OUTPATIENT
Start: 2023-02-16

## 2023-02-16 RX ORDER — NEBULIZER AND COMPRESSOR
1 EACH MISCELLANEOUS
Qty: 1 EACH | Refills: 0 | Status: SHIPPED | OUTPATIENT
Start: 2023-02-16 | End: 2023-02-16 | Stop reason: SDUPTHER

## 2023-02-16 RX ORDER — IPRATROPIUM BROMIDE AND ALBUTEROL SULFATE 2.5; .5 MG/3ML; MG/3ML
3 SOLUTION RESPIRATORY (INHALATION)
Qty: 20 EACH | Refills: 0 | Status: SHIPPED | OUTPATIENT
Start: 2023-02-16

## 2023-02-16 RX ADMIN — IPRATROPIUM BROMIDE AND ALBUTEROL SULFATE 3 ML: 2.5; .5 SOLUTION RESPIRATORY (INHALATION) at 20:26

## 2023-02-16 RX ADMIN — IOPAMIDOL 100 ML: 755 INJECTION, SOLUTION INTRAVENOUS at 21:33

## 2023-02-16 RX ADMIN — METHYLPREDNISOLONE SODIUM SUCCINATE 125 MG: 125 INJECTION, POWDER, FOR SOLUTION INTRAMUSCULAR; INTRAVENOUS at 20:41

## 2023-02-17 NOTE — ED PROVIDER NOTES
[facility] EMERGENCY DEPT  EMERGENCY DEPARTMENT ENCOUNTER       Pt Name: Olegario Palm  MRN: [id number]  [place] 1960  Date of evaluation: 2/16/2023  Provider: Dave Handley DO   PCP: Charisse Morton MD  Note Started: 8:19 PM 2/16/23     CHIEF COMPLAINT       Chief Complaint   Patient presents with    Referral / Consult     Pt arrives ambulatory to triage, reports he was diagnosed with L lower PNA a month ago, on his second round of abx. Seen at [facility] today and sent over for CT imaging. Pt reports some chest tightness at this time. HISTORY OF PRESENT ILLNESS: 1 or more elements      History From: Patient, History limited by: none     Olegario Palm is a 58 y.o. male presenting the emergency department complaining of shortness of breath and cough. Symptoms been going on for a few weeks, has done multiple courses of antibiotics, recently started an inhaler with no improvement, was sent over here for a CT of his chest to assess for pneumonia, PE. Please See MDM for Additional Details of the HPI/PMH  Nursing Notes were all reviewed and agreed with or any disagreements were addressed in the HPI. REVIEW OF SYSTEMS        Positives and Pertinent negatives as per HPI.     PAST HISTORY     Past Medical History:  Past Medical History:   Diagnosis Date    Adverse effect of anesthesia     woke up during colonoscopy and felt pressure    Arthritis     Chronic kidney disease     has one kidney; born with only one    Chronic pain     back    Diabetes ([address]     Hypertension     Ill-defined condition 2013    [place] palsy    Sleep apnea     has CPAP, doesn't use it/uses it per pt on 8/7/2020    Sleep disorder     sleep apnea       Past Surgical History:  Past Surgical History:   Procedure Laterality Date    COLONOSCOPY N/A 8/19/2020    COLONOSCOPY    :- performed by Kylie Bravo MD at [facility] ENDOSCOPY    [address]  02/10/2016    Laparoscopic Cholecystectomy    HX ORTHOPAEDIC      wrist cyst as teenager HX UROLOGICAL  2008    lap sx to remove remnants of kidney (bladder knicked during this surgery and had an ileus)/second lap surgery on kidney to remove the rest of the remnants    MO ABDOMEN SURGERY PROC UNLISTED      inguinal hernia repair as a child    MO ABDOMEN SURGERY PROC UNLISTED  2/10/16    Laparoscopic cholecystectomy with cholangiogram, interpretation of cholangiogram        Family History:  Family History   Problem Relation Age of Onset    Diabetes Maternal Grandmother        Social History:  Social History     Tobacco Use    Smoking status: Never    Smokeless tobacco: Never   Substance Use Topics    Alcohol use: Yes     Comment: socially    Drug use: No       Allergies:  No Known Allergies    CURRENT MEDICATIONS      Discharge Medication List as of 2/16/2023 10:41 PM        CONTINUE these medications which have NOT CHANGED    Details   cyclobenzaprine (FLEXERIL) 10 mg tablet Take 10-20 mg by mouth as needed for Muscle Spasm(s). , Historical Med      dulaglutide (Trulicity) 1.5 RQ/4.6 mL sub-q pen 1.5 mg by SubCUTAneous route every seven (7) days. , Historical Med      insulin glargine,hum.rec.anlog (BASAGLAR KWIKPEN U-100 INSULIN SC) 100 Units by SubCUTAneous route daily. , Historical Med      metFORMIN ER (glucophage XR) 500 mg tablet Take 500 mg by mouth two (2) times a day., Historical Med      acetaminophen (TYLENOL ARTHRITIS PO) Take  by mouth as needed., Historical Med      bisoprolol (ZEBETA) 5 mg tablet Take 5 mg by mouth daily. , Historical Med      atorvastatin (LIPITOR) 10 mg tablet Take 10 mg by mouth nightly., Historical Med      gabapentin (NEURONTIN) 300 mg capsule Take 900 mg by mouth two (2) times a day., Historical Med      HYDROcodone-acetaminophen (NORCO) 7.5-325 mg per tablet Take 1 Tab by mouth three (3) times daily as needed., Historical Med      Tramadol (RYZOLT) 300 mg TM24 300 mg, Oral, DAILY Starting 12/23/2010, Until Discontinued, Historical Med             SCREENINGS No data recorded         PHYSICAL EXAM      ED Triage Vitals [02/16/23 1712]   ED Encounter Vitals Group      BP (!) 180/93      Pulse (Heart Rate) 75      Resp Rate 20      Temp 97.9 °F (36.6 °C)      Temp src       O2 Sat (%) 99 %      Weight 227 lb 11.8 oz      Height 6'        Physical Exam  Vitals and nursing note reviewed. Constitutional:       Appearance: He is well-developed. HENT:      Head: Normocephalic and atraumatic. Eyes:      General:         Right eye: No discharge. Left eye: No discharge. Conjunctiva/sclera: Conjunctivae normal.      Pupils: Pupils are equal, round, and reactive to light. Neck:      Trachea: No tracheal deviation. Cardiovascular:      Rate and Rhythm: Normal rate and regular rhythm. Heart sounds: Normal heart sounds. No murmur heard. Pulmonary:      Effort: Pulmonary effort is normal. No respiratory distress. Breath sounds: Wheezing present. No rales. Abdominal:      General: Bowel sounds are normal.      Palpations: Abdomen is soft. Tenderness: There is no abdominal tenderness. There is no guarding or rebound. Musculoskeletal:         General: No tenderness or deformity. Normal range of motion. Cervical back: Normal range of motion and neck supple. Skin:     General: Skin is warm and dry. Findings: No erythema or rash. Neurological:      Mental Status: He is alert and oriented to person, place, and time.    Psychiatric:         Behavior: Behavior normal.        DIAGNOSTIC RESULTS   LABS:     Recent Results (from the past 12 hour(s))   EKG, 12 LEAD, INITIAL    Collection Time: 02/16/23  5:23 PM   Result Value Ref Range    Ventricular Rate 75 BPM    Atrial Rate 75 BPM    P-R Interval 226 ms    QRS Duration 110 ms    Q-T Interval 410 ms    QTC Calculation (Bezet) 457 ms    Calculated P Axis 30 degrees    Calculated R Axis 39 degrees    Calculated T Axis 24 degrees    Diagnosis       Sinus rhythm with 1st degree AV block  Possible Left atrial enlargement  Incomplete right bundle branch block  Possible Inferior infarct (cited on or before 29-MAY-2022)  Possible Anterior infarct , age undetermined  When compared with ECG of 29-MAY-2022 12:44,  Nonspecific T wave abnormality has replaced inverted T waves in Inferior   leads     CBC WITH AUTOMATED DIFF    Collection Time: 02/16/23  5:29 PM   Result Value Ref Range    WBC 9.1 4.1 - 11.1 K/uL    RBC 4.08 (L) 4.10 - 5.70 M/uL    HGB 12.9 12.1 - 17.0 g/dL    HCT 37.5 36.6 - 50.3 %    MCV 91.9 80.0 - 99.0 FL    MCH 31.6 26.0 - 34.0 PG    MCHC 34.4 30.0 - 36.5 g/dL    RDW 12.3 11.5 - 14.5 %    PLATELET 014 993 - 921 K/uL    MPV 9.9 8.9 - 12.9 FL    NRBC 0.0 0  WBC    ABSOLUTE NRBC 0.00 0.00 - 0.01 K/uL    NEUTROPHILS 69 32 - 75 %    LYMPHOCYTES 22 12 - 49 %    MONOCYTES 6 5 - 13 %    EOSINOPHILS 3 0 - 7 %    BASOPHILS 0 0 - 1 %    IMMATURE GRANULOCYTES 0 0.0 - 0.5 %    ABS. NEUTROPHILS 6.2 1.8 - 8.0 K/UL    ABS. LYMPHOCYTES 2.0 0.8 - 3.5 K/UL    ABS. MONOCYTES 0.6 0.0 - 1.0 K/UL    ABS. EOSINOPHILS 0.3 0.0 - 0.4 K/UL    ABS. BASOPHILS 0.0 0.0 - 0.1 K/UL    ABS. IMM. GRANS. 0.0 0.00 - 0.04 K/UL    DF AUTOMATED     METABOLIC PANEL, COMPREHENSIVE    Collection Time: 02/16/23  5:29 PM   Result Value Ref Range    Sodium 142 136 - 145 mmol/L    Potassium 3.2 (L) 3.5 - 5.1 mmol/L    Chloride 106 97 - 108 mmol/L    CO2 31 21 - 32 mmol/L    Anion gap 5 5 - 15 mmol/L    Glucose 151 (H) 65 - 100 mg/dL    BUN 20 6 - 20 MG/DL    Creatinine 1.38 (H) 0.70 - 1.30 MG/DL    BUN/Creatinine ratio 14 12 - 20      eGFR 58 (L) >60 ml/min/1.73m2    Calcium 8.6 8.5 - 10.1 MG/DL    Bilirubin, total 1.3 (H) 0.2 - 1.0 MG/DL    ALT (SGPT) 36 12 - 78 U/L    AST (SGOT) 29 15 - 37 U/L    Alk.  phosphatase 72 45 - 117 U/L    Protein, total 6.7 6.4 - 8.2 g/dL    Albumin 2.8 (L) 3.5 - 5.0 g/dL    Globulin 3.9 2.0 - 4.0 g/dL    A-G Ratio 0.7 (L) 1.1 - 2.2     TROPONIN-HIGH SENSITIVITY    Collection Time: 02/16/23 5:29 PM   Result Value Ref Range    Troponin-High Sensitivity 17 0 - 76 ng/L   NT-PRO BNP    Collection Time: 02/16/23  5:29 PM   Result Value Ref Range    NT pro- (H) <125 PG/ML        EKG: If performed, independent interpretation documented below in the MDM section     RADIOLOGY:  Non-plain film images such as CT, Ultrasound and MRI are read by the radiologist. Plain radiographic images are visualized and preliminarily interpreted by the ED Provider with the findings documented in the MDM section. Interpretation per the Radiologist below, if available at the time of this note:     CTA CHEST W OR W WO CONT    Result Date: 2/16/2023  EXAM:  CTA CHEST W OR W WO CONT INDICATION: PE COMPARISON: None. TECHNIQUE: Helical thin section chest CT following uneventful intravenous administration of nonionic contrast 100 mL of isovue 370 according to departmental PE protocol. Coronal and sagittal reformats were performed. 3D post processing was performed. CT dose reduction was achieved through the use of a standardized protocol tailored for this examination and automatic exposure control for dose modulation. FINDINGS: This is a good quality study for the evaluation of pulmonary embolism to the first subsegmental arterial level. There is no pulmonary embolism to this level. THYROID: No nodule. MEDIASTINUM: No mass or lymphadenopathy. ARI: No mass or lymphadenopathy. THORACIC AORTA: No aneurysm. HEART: Normal in size. ESOPHAGUS: No wall thickening or dilatation. TRACHEA/BRONCHI: Patent. PLEURA: No effusion or pneumothorax. LUNGS: No nodule, mass, or airspace disease. UPPER ABDOMEN: Partially imaged. No acute pathology. BONES: No aggressive bone lesion or fracture. 1. No evidence of pulmonary embolism. 2. Clear lungs.         PROCEDURES   Unless otherwise noted below, none  Procedures     CRITICAL CARE TIME       EMERGENCY DEPARTMENT COURSE and DIFFERENTIAL DIAGNOSIS/MDM   Vitals:    Vitals:    02/16/23 1712 02/16/23 2026   BP: (!) 180/93    Pulse: 75    Resp: 20    Temp: 97.9 °F (36.6 °C)    SpO2: 99% 97%   Weight: 103.3 kg (227 lb 11.8 oz)    Height: 6' (1.829 m)         Patient was given the following medications:  Medications   albuterol-ipratropium (DUO-NEB) 2.5 MG-0.5 MG/3 ML (3 mL Nebulization Given 2/16/23 2026)   methylPREDNISolone (PF) (Solu-MEDROL) injection 125 mg (125 mg IntraVENous Given 2/16/23 2041)   iopamidoL (ISOVUE-370) 370 mg iodine /mL (76 %) injection 100 mL (100 mL IntraVENous Given 2/16/23 2133)       Medical Decision Making  27-year-old male, non-smoker presenting with shortness of breath, has wheezing bilaterally on exam, he is in no respiratory distress, he is saturating 100% on room air and has been treated with multiple course of antibiotics, significant rule out PE. Clinical suspicion for acute PE is low currently. Will obtain a CTA of the chest rule out PE. Will give nebs, steroids. Patient is an insulin-dependent diabetic, he takes Trulicity and metformin as well. Discussed that if his CT imaging is unremarkable, does not show any obvious pneumonia then we would likely discharge home with some course of steroids to help with the inflammatory process in his lungs. We will give the patient a spacer chamber for his inhaler as I think this will help him take the medicine more effectively. Amount and/or Complexity of Data Reviewed  Labs: ordered. Radiology: ordered. ECG/medicine tests: ordered and independent interpretation performed. Details: EKG shows sinus rhythm, rate 75. RSR prime pattern. No evidence of acute ST elevation myocardial infarction,    Risk  Prescription drug management. FINAL IMPRESSION     1. Wheezing          DISPOSITION/PLAN   Uri Sindhushayy Eli's  results have been reviewed with him. He has been counseled regarding his diagnosis, treatment, and plan.   He verbally conveys understanding and agreement of the signs, symptoms, diagnosis, treatment and prognosis and additionally agrees to follow up as discussed. He also agrees with the care-plan and conveys that all of his questions have been answered. I have also provided discharge instructions for him that include: educational information regarding their diagnosis and treatment, and list of reasons why they would want to return to the ED prior to their follow-up appointment, should his condition change. CLINICAL IMPRESSION    Discharge Note: The patient is stable for discharge home. The signs, symptoms, diagnosis, and discharge instructions have been discussed, understanding conveyed, and agreed upon. The patient is to follow up as recommended or return to ER should their symptoms worsen. PATIENT REFERRED TO:  Follow-up Information       Follow up With Specialties Details Why Contact Info    Jeanette Rodriguez MD [facility] Medicine Schedule an appointment as soon as possible for a visit   [address] [place] [address]  [phone]      [facility] EMERGENCY DEPT Emergency Medicine  If symptoms worsen [address] [place] [address]  [phone]              DISCHARGE MEDICATIONS:  Discharge Medication List as of 2/16/2023 10:41 PM        START taking these medications    Details   albuterol-ipratropium (DUO-NEB) 2.5 mg-0.5 mg/3 ml nebu 3 mL by Nebulization route every four (4) hours as needed for Wheezing., Normal, Disp-20 Each, R-0      predniSONE (DELTASONE) 20 mg tablet Take 2 Tablets by mouth daily for 5 days.  With Breakfast, Normal, Disp-10 Tablet, R-0      albuterol (PROVENTIL HFA, VENTOLIN HFA, PROAIR HFA) 90 mcg/actuation inhaler Take 2 Puffs by inhalation every four (4) hours as needed for Wheezing., Normal, Disp-18 g, R-0      Nebulizer & Compressor machine 1 Each by Does Not Apply route now for 1 dose., Print, Disp-1 Each, R-0           CONTINUE these medications which have NOT CHANGED    Details   cyclobenzaprine (FLEXERIL) 10 mg tablet Take 10-20 mg by mouth as needed for Muscle Spasm(s). , Historical Med      dulaglutide (Trulicity) 1.5 PG/2.7 mL sub-q pen 1.5 mg by SubCUTAneous route every seven (7) days. , Historical Med      insulin glargine,hum.rec.anlog (BASAGLAR KWIKPEN U-100 INSULIN SC) 100 Units by SubCUTAneous route daily. , Historical Med      metFORMIN ER (glucophage XR) 500 mg tablet Take 500 mg by mouth two (2) times a day., Historical Med      acetaminophen (TYLENOL ARTHRITIS PO) Take  by mouth as needed., Historical Med      bisoprolol (ZEBETA) 5 mg tablet Take 5 mg by mouth daily. , Historical Med      atorvastatin (LIPITOR) 10 mg tablet Take 10 mg by mouth nightly., Historical Med      gabapentin (NEURONTIN) 300 mg capsule Take 900 mg by mouth two (2) times a day., Historical Med      HYDROcodone-acetaminophen (NORCO) 7.5-325 mg per tablet Take 1 Tab by mouth three (3) times daily as needed., Historical Med      Tramadol (RYZOLT) 300 mg TM24 300 mg, Oral, DAILY Starting 12/23/2010, Until Discontinued, Historical Med               DISCONTINUED MEDICATIONS:  Discharge Medication List as of 2/16/2023 10:41 PM          I am the Primary Clinician of Record. Saranya Villanueva DO (electronically signed)    (Please note that parts of this dictation were completed with voice recognition software. Quite often unanticipated grammatical, syntax, homophones, and other interpretive errors are inadvertently transcribed by the computer software. Please disregards these errors.  Please excuse any errors that have escaped final proofreading.)

## 2023-02-18 LAB
ATRIAL RATE: 75 BPM
CALCULATED P AXIS, ECG09: 30 DEGREES
CALCULATED R AXIS, ECG10: 39 DEGREES
CALCULATED T AXIS, ECG11: 24 DEGREES
DIAGNOSIS, 93000: NORMAL
P-R INTERVAL, ECG05: 226 MS
Q-T INTERVAL, ECG07: 410 MS
QRS DURATION, ECG06: 110 MS
QTC CALCULATION (BEZET), ECG08: 457 MS
VENTRICULAR RATE, ECG03: 75 BPM

## 2023-05-07 ENCOUNTER — HOSPITAL ENCOUNTER (EMERGENCY)
Facility: HOSPITAL | Age: 63
Discharge: HOME | End: 2023-05-07
Attending: EMERGENCY MEDICINE

## 2023-05-07 VITALS
OXYGEN SATURATION: 97 % | TEMPERATURE: 97.7 F | DIASTOLIC BLOOD PRESSURE: 84 MMHG | RESPIRATION RATE: 10 BRPM | WEIGHT: 233.69 LBS | BODY MASS INDEX: 31.65 KG/M2 | SYSTOLIC BLOOD PRESSURE: 149 MMHG | HEIGHT: 72 IN | HEART RATE: 68 BPM

## 2023-05-07 DIAGNOSIS — E16.0 HYPOGLYCEMIA DUE TO INSULIN: ICD-10-CM

## 2023-05-07 DIAGNOSIS — T38.3X5A HYPOGLYCEMIA DUE TO INSULIN: ICD-10-CM

## 2023-05-07 DIAGNOSIS — I16.0 HYPERTENSIVE URGENCY: Primary | ICD-10-CM

## 2023-05-07 LAB
ANION GAP BLD CALC-SCNC: 8 MMOL/L (ref 10–20)
CA-I BLD-MCNC: 1.22 MMOL/L (ref 1.12–1.32)
CHLORIDE BLD-SCNC: 102 MMOL/L (ref 98–107)
CO2 BLD-SCNC: 32.2 MMOL/L (ref 21–32)
CREAT BLD-MCNC: 1.21 MG/DL (ref 0.6–1.3)
GLUCOSE BLD STRIP.AUTO-MCNC: 82 MG/DL (ref 65–117)
GLUCOSE BLD-MCNC: 108 MG/DL (ref 65–100)
POTASSIUM BLD-SCNC: 4.4 MMOL/L (ref 3.5–5.1)
SERVICE CMNT-IMP: ABNORMAL
SERVICE CMNT-IMP: NORMAL
SODIUM BLD-SCNC: 141 MMOL/L (ref 136–145)

## 2023-05-07 PROCEDURE — 80047 BASIC METABLC PNL IONIZED CA: CPT

## 2023-05-07 PROCEDURE — 6370000000 HC RX 637 (ALT 250 FOR IP): Performed by: EMERGENCY MEDICINE

## 2023-05-07 PROCEDURE — 82962 GLUCOSE BLOOD TEST: CPT

## 2023-05-07 PROCEDURE — 99284 EMERGENCY DEPT VISIT MOD MDM: CPT

## 2023-05-07 RX ORDER — LABETALOL HYDROCHLORIDE 5 MG/ML
20 INJECTION, SOLUTION INTRAVENOUS
Status: DISCONTINUED | OUTPATIENT
Start: 2023-05-07 | End: 2023-05-07

## 2023-05-07 RX ORDER — CLONIDINE HYDROCHLORIDE 0.1 MG/1
0.2 TABLET ORAL
Status: COMPLETED | OUTPATIENT
Start: 2023-05-07 | End: 2023-05-07

## 2023-05-07 RX ORDER — AMLODIPINE BESYLATE 10 MG/1
10 TABLET ORAL DAILY
Qty: 30 TABLET | Refills: 1 | Status: SHIPPED | OUTPATIENT
Start: 2023-05-07

## 2023-05-07 RX ADMIN — CLONIDINE HYDROCHLORIDE 0.2 MG: 0.1 TABLET ORAL at 16:24

## 2023-05-07 RX ADMIN — METOPROLOL TARTRATE 25 MG: 25 TABLET, FILM COATED ORAL at 15:41

## 2023-05-07 ASSESSMENT — PAIN - FUNCTIONAL ASSESSMENT: PAIN_FUNCTIONAL_ASSESSMENT: NONE - DENIES PAIN

## 2023-05-08 NOTE — ED PROVIDER NOTES
EMERGENCY DEPARTMENT HISTORY AND PHYSICAL EXAM    Date: 5/7/2023  Patient Name: Jorje Rios  Patient Age and Sex: 61 y.o. male  MRN:  [id number]  CSN:  [id number]    History of Present Illness     Chief Complaint   Patient presents with    Dizziness     Arrives via EMS, woke up feeling dizzy about 8AM - initial BP via /100 - BG 52, pt takes metformin and insulin, pt received two oral glucose en route, up to 85 per EMS, per wife and EMS pt slower to respond than normal, EKG SR en route       History Provided By: Patient    Ability to gather history was limited by:     HPI: Jorje Rios, 61 y.o. male with history of diabetes, hypertension presents to the emergency department feeling dizzy and lightheaded this morning, diaphoretic, very hypertensive. Per EMS his blood sugar was found to be 52. He takes insulin. He was administered oral glucose by EMS and his blood sugar came up to 85. At the time of my H&P he is reporting some mild frontal pressure sensation, no other focal neurodeficits. Tobacco Use      Smoking status: Never      Smokeless tobacco: Never     Past History   The patient's medical, surgical, and social history were reviewed by me today. Current Medications:  No current facility-administered medications on file prior to encounter. Current Outpatient Medications on File Prior to Encounter   Medication Sig Dispense Refill    albuterol sulfate HFA (PROVENTIL;VENTOLIN;PROAIR) 108 (90 Base) MCG/ACT inhaler Inhale 2 puffs into the lungs every 4 hours as needed      atorvastatin (LIPITOR) 10 MG tablet Take 10 mg by mouth      bisoprolol (ZEBETA) 5 MG tablet Take 5 mg by mouth daily      cyclobenzaprine (FLEXERIL) 10 MG tablet Take 10-20 mg by mouth as needed      dulaglutide (TRULICITY) 1.5 YD/9.5EE SC injection Inject 1.5 mg into the skin every 7 days      gabapentin (NEURONTIN) 300 MG capsule Take 900 mg by mouth 2 times daily.       HYDROcodone-acetaminophen (NORCO) 7.5-325 MG per

## 2024-09-13 NOTE — PROGRESS NOTES
Erp suturing finger. Pt vagaled per erp and became light headed.  Pt laid down on bed.    RN placed pt in reverse trendelenburg.    Hospital follow-up PCP transitional care appointment has been scheduled with Dr. Andrea Chavez for Monday, June 6, 2022 at 2:30 p.m. Kristi Rao Pending patient discharge.   Lyn Zambrano, Care Management Assistant

## 2024-12-30 ENCOUNTER — HOSPITAL ENCOUNTER (OUTPATIENT)
Facility: HOSPITAL | Age: 64
Discharge: HOME | End: 2024-12-30
Attending: INTERNAL MEDICINE | Admitting: INTERNAL MEDICINE
Payer: MEDICARE

## 2024-12-30 VITALS
SYSTOLIC BLOOD PRESSURE: 174 MMHG | WEIGHT: 240 LBS | TEMPERATURE: 97.9 F | DIASTOLIC BLOOD PRESSURE: 95 MMHG | BODY MASS INDEX: 32.51 KG/M2 | RESPIRATION RATE: 21 BRPM | OXYGEN SATURATION: 100 % | HEIGHT: 72 IN | HEART RATE: 61 BPM

## 2024-12-30 DIAGNOSIS — R94.39 ABNORMAL STRESS TEST: ICD-10-CM

## 2024-12-30 LAB
ECHO BSA: 2.35 M2
GLUCOSE BLD STRIP.AUTO-MCNC: 247 MG/DL (ref 65–117)
SERVICE CMNT-IMP: ABNORMAL

## 2024-12-30 PROCEDURE — 2709999900 HC NON-CHARGEABLE SUPPLY: Performed by: INTERNAL MEDICINE

## 2024-12-30 PROCEDURE — C1713 ANCHOR/SCREW BN/BN,TIS/BN: HCPCS | Performed by: INTERNAL MEDICINE

## 2024-12-30 PROCEDURE — C1894 INTRO/SHEATH, NON-LASER: HCPCS | Performed by: INTERNAL MEDICINE

## 2024-12-30 PROCEDURE — 6360000002 HC RX W HCPCS: Performed by: INTERNAL MEDICINE

## 2024-12-30 PROCEDURE — C1769 GUIDE WIRE: HCPCS | Performed by: INTERNAL MEDICINE

## 2024-12-30 PROCEDURE — 6360000004 HC RX CONTRAST MEDICATION: Performed by: INTERNAL MEDICINE

## 2024-12-30 PROCEDURE — 2580000003 HC RX 258: Performed by: INTERNAL MEDICINE

## 2024-12-30 PROCEDURE — 82962 GLUCOSE BLOOD TEST: CPT

## 2024-12-30 PROCEDURE — 93458 L HRT ARTERY/VENTRICLE ANGIO: CPT | Performed by: INTERNAL MEDICINE

## 2024-12-30 PROCEDURE — 2500000003 HC RX 250 WO HCPCS: Performed by: INTERNAL MEDICINE

## 2024-12-30 PROCEDURE — 99152 MOD SED SAME PHYS/QHP 5/>YRS: CPT | Performed by: INTERNAL MEDICINE

## 2024-12-30 RX ORDER — INSULIN GLARGINE 100 [IU]/ML
75 INJECTION, SOLUTION SUBCUTANEOUS NIGHTLY
COMMUNITY

## 2024-12-30 RX ORDER — FENTANYL CITRATE 50 UG/ML
INJECTION, SOLUTION INTRAMUSCULAR; INTRAVENOUS PRN
Status: DISCONTINUED | OUTPATIENT
Start: 2024-12-30 | End: 2024-12-30 | Stop reason: HOSPADM

## 2024-12-30 RX ORDER — HEPARIN SODIUM 200 [USP'U]/100ML
INJECTION, SOLUTION INTRAVENOUS CONTINUOUS PRN
Status: COMPLETED | OUTPATIENT
Start: 2024-12-30 | End: 2024-12-30

## 2024-12-30 RX ORDER — MIDAZOLAM HYDROCHLORIDE 1 MG/ML
INJECTION, SOLUTION INTRAMUSCULAR; INTRAVENOUS PRN
Status: DISCONTINUED | OUTPATIENT
Start: 2024-12-30 | End: 2024-12-30 | Stop reason: HOSPADM

## 2024-12-30 RX ORDER — HEPARIN SODIUM 1000 [USP'U]/ML
INJECTION, SOLUTION INTRAVENOUS; SUBCUTANEOUS PRN
Status: DISCONTINUED | OUTPATIENT
Start: 2024-12-30 | End: 2024-12-30 | Stop reason: HOSPADM

## 2024-12-30 RX ORDER — VERAPAMIL HYDROCHLORIDE 2.5 MG/ML
INJECTION, SOLUTION INTRAVENOUS PRN
Status: DISCONTINUED | OUTPATIENT
Start: 2024-12-30 | End: 2024-12-30 | Stop reason: HOSPADM

## 2024-12-30 RX ORDER — ASPIRIN 81 MG/1
81 TABLET ORAL DAILY
COMMUNITY

## 2024-12-30 RX ORDER — VALSARTAN 160 MG/1
160 TABLET ORAL DAILY
COMMUNITY

## 2024-12-30 RX ORDER — SODIUM CHLORIDE 9 MG/ML
INJECTION, SOLUTION INTRAVENOUS CONTINUOUS
Status: DISCONTINUED | OUTPATIENT
Start: 2024-12-30 | End: 2024-12-30 | Stop reason: HOSPADM

## 2024-12-30 RX ORDER — LIDOCAINE HYDROCHLORIDE 10 MG/ML
INJECTION, SOLUTION INFILTRATION; PERINEURAL PRN
Status: DISCONTINUED | OUTPATIENT
Start: 2024-12-30 | End: 2024-12-30 | Stop reason: HOSPADM

## 2024-12-30 RX ORDER — IOPAMIDOL 755 MG/ML
INJECTION, SOLUTION INTRAVASCULAR PRN
Status: DISCONTINUED | OUTPATIENT
Start: 2024-12-30 | End: 2024-12-30 | Stop reason: HOSPADM

## 2024-12-30 RX ADMIN — SODIUM CHLORIDE 50 ML/HR: 9 INJECTION, SOLUTION INTRAVENOUS at 09:30

## 2024-12-30 NOTE — PROCEDURES
1108  Pt arrived to recovery room post procedure  Arm immobilizer placed on affected wrist.  MD in to talk to pt. Pt very drowsy, MD will be back to talk to pt.     TR BANDS: Right Radial Artery  1200  Attempted to remove air. Bleeding occurred at right radial site. Will retry air removal in 30 min.    1230  Began removal of air from TR BAND. No bleeding and no hematoma present at sites.    1430  Pt more awake now, MD informed. MD coming to talk to patient.  Pt eating and tolerating PO diet well.    1445  Removal of air from TR Band complete. No bleeding and no hematoma.    1500  TR Band removed. No bleeding and no hematoma present. Tegaderm and gauze dressing applied.     1510  MD in room talking to pt.     1520  Pt walked to the restroom and voided successfully. Site(s) have no bleeding and no hematoma present    1527  RN removed pt IV.  Pt getting dressed  RN called pt ride home.    1540  Educated patient about their sedation precautions such as not driving, operating any machinery, or signing legal documents 24 hours post procedure.  Reviewed discharge instructions, including medications and site care using the teach back method. Answered all questions. Verbalized understanding. Pt had an opportunity to ask questions. Pt is also aware of how to handle a site if it starts bleeding or develops a hematoma.    1555  Arm immobilizer placed on affected wrist.  Pt discharged to ride at main entrance of hospital via wheel chair by RN.  Pt discharged withDischarge Paperwork, Extra Band Aids, Clothing, Wrist Immobilizer, and \"Who Cared For You\" Card

## 2024-12-30 NOTE — PROGRESS NOTES
Cardiac Cath Lab Recovery Arrival Note:      Syed Almazan arrived to Cardiac Cath Lab, Recovery Area. Staff introduced to patient. Patient identifiers verified with NAME and DATE OF BIRTH. Procedure verified with patient. Consent forms reviewed and signed by patient or authorized representative and verified. Allergies verified.     Patient and family oriented to department. Patient and family informed of procedure and plan of care.     Questions answered with review. Patient prepped for procedure, per orders from physician, prior to arrival.    Patient on cardiac monitor, non-invasive blood pressure, SPO2 monitor. On room air. Patient is A&Ox 4. Patient reports no complaints.     Patient in stretcher, in low position, with side rails up, call bell within reach, patient instructed to call if assistance as needed.    Patient prep in: CCL , [place] FT bay 4.   Patient family has phone # [phone]  Family in: wife Ericka Almazan in hospital  Pre cath teaching completed with pt and wife.

## 2024-12-30 NOTE — FLOWSHEET NOTE
Cardiac Cath Lab Procedure Area Arrival Note:    Syed Almazan arrived to Cardiac Cath Lab, Procedure Area. Patient identifiers verified with NAME and DATE OF BIRTH. Procedure verified with patient. Consent forms verified. Allergies verified. Patient informed of procedure and plan of care. Questions answered with review. Patient voiced understanding of procedure and plan of care.    Patient on cardiac monitor, non-invasive blood pressure, SPO2 monitor. On  O2 @ 2 lpm via nc.  IV of NS on pump at 50 ml/hr. Patient status doing well without problems. Patient is A&Ox 4. Patient reports no chest pain.     Patient medicated during procedure with orders obtained and verified by Dr. Lynch.    Refer to patients Cardiac Cath Lab PROCEDURE REPORT for vital signs, assessment, status, and response during procedure, printed at end of case. Printed report on chart or scanned into chart.

## 2024-12-30 NOTE — DISCHARGE INSTRUCTIONS
Patient Discharge Instructions    Syed Sarmientosudha / [id number] : 1960    Admitted 2024 Discharged: 2024         CARDIAC CATHETERIZATION/ “CATH” STENT PLACEMENT   DISCHARGE INSTRUCTIONS    Hold your metformin until Thursday as it can interact with the dye we use for the cardiac cath.     If possible, have someone stay with you for the first night. It is normal to feel tired for the first couple of days.  Take it easy and follow your physician’s instructions on activity.    CHECK THE CATHETER INSERTION SITE DAILY:    If bleeding at the cath site occurs, take a clean washcloth and apply direct pressure just above the puncture site for at least 15 minutes.  Call 911 immediately if the bleeding is not controlled.  Continue to apply direct pressure until an ambulance gets to your location. Do not try to drive yourself or have someone else drive you to the hospital.  Have the ambulance bring you to the emergency room.    You may shower 24 hours after your procedure. Gently remove the bandage during showering.  Wash with soap and water and pat dry.  To prevent infection, keep the groin area/insertion site clean and dry.  Do not apply powders, creams, lotions, or ointments to the site for 5 days. You may cover the site with a fresh Band-Aid each day until well healed.  You may notice a small lump at the site. This is normal and may last up to 6 weeks.    CALL THE PHYSICIAN:  If the site becomes red, swollen, or feels warm to the touch, or is healing poorly    If you note any large/extending bruise, fever >101.0 or chills  If your extremity has numbness, tingling, discoloration, abnormal swelling, tightness or pain   If you have difficulty with urination or develop nausea, vomiting, or severe abdominal pain    ACTIVITY for the first 24-48 hours, or as instructed by your physician:  No lifting, pushing or pulling over 10 pounds and no straining the insertion site. Do not lift grocery bags or the garbage

## 2024-12-30 NOTE — PROGRESS NOTES
1108  [facility] Procedural to Recovery REPORT:    Verbal report received from MARCELLA Zee on Syed Almazan  being received from [facility] Procedural Area for routine progression of care. Report consisted of patient’s Situation, Background, Assessment and Recommendations(SBAR). Information from the following report(s) Procedure, Findings, Medications, and Site Assessment; was reviewed with the receiving clinician. Opportunity for questions and clarification was provided. Assessment completed upon patient’s arrival to Cardiac Cath Lab RECOVERY AREA and care assumed.       Cardiac Cath Lab Recovery Arrival Note:    Syed Almazan arrived to [facility] recovery area.  Patient procedure= C. Patient on cardiac monitor, non-invasive blood pressure, SPO2 monitor. On room air.  IV  of NS on pump at 25 ml/hr. Patient status doing well without problems. Patient is A&Ox 4. Patient reports no complaints.    PROCEDURE SITE CHECK:    Procedure site:without any bleeding and no hematoma, no pain/discomfort reported at procedure site.     No change in patient status. Continue to monitor patient and status.

## 2024-12-30 NOTE — FLOWSHEET NOTE
CATH LAB to RECOVERY ROOM REPORT    Procedure: [facility]    MD: JOSEMANUEL Lynch MD    The procedure was diagnostic only.    Verbal Report given to Recovery Nurse on patient being transferred to Cardiac Cath Lab  for routine post-op. Patient stable upon transfer to .  Vitals, mental status, MAR, procedural summary discussed with recovery RN.    Medication given during procedure:    Contrast:17 mL                          Heparin:5000 units     Versed:3 mg                                                               Fentanyl:75 mcg                                                               Other Meds/Drips given:          Sheaths:    Right radial sheath pulled at 1100 am, band at 12mL of air

## 2025-04-14 ENCOUNTER — OFFICE VISIT (OUTPATIENT)
Age: 65
End: 2025-04-14
Payer: MEDICARE

## 2025-04-14 VITALS
WEIGHT: 256 LBS | OXYGEN SATURATION: 96 % | SYSTOLIC BLOOD PRESSURE: 139 MMHG | TEMPERATURE: 97.2 F | BODY MASS INDEX: 34.67 KG/M2 | HEIGHT: 72 IN | HEART RATE: 73 BPM | DIASTOLIC BLOOD PRESSURE: 82 MMHG

## 2025-04-14 DIAGNOSIS — C44.92 SQUAMOUS CELL SKIN CANCER: Primary | ICD-10-CM

## 2025-04-14 PROCEDURE — 99203 OFFICE O/P NEW LOW 30 MIN: CPT | Performed by: SURGERY

## 2025-04-14 PROCEDURE — 3075F SYST BP GE 130 - 139MM HG: CPT | Performed by: SURGERY

## 2025-04-14 PROCEDURE — 1123F ACP DISCUSS/DSCN MKR DOCD: CPT | Performed by: SURGERY

## 2025-04-14 PROCEDURE — 3079F DIAST BP 80-89 MM HG: CPT | Performed by: SURGERY

## 2025-04-14 ASSESSMENT — PATIENT HEALTH QUESTIONNAIRE - PHQ9
1. LITTLE INTEREST OR PLEASURE IN DOING THINGS: NOT AT ALL
2. FEELING DOWN, DEPRESSED OR HOPELESS: NOT AT ALL
SUM OF ALL RESPONSES TO PHQ QUESTIONS 1-9: 0

## 2025-04-14 NOTE — PROGRESS NOTES
The patient (or guardian, if applicable) and other individuals in attendance with the patient were advised that Artificial Intelligence will be utilized during this visit to record and process the conversation to generate a clinical note. The patient (or guardian, if applicable) and other individuals in attendance at the appointment consented to the use of AI, including the recording. Other portions were at least partially completed with Dragon, the computer voice recognition software.  Quite often unanticipated grammatical, syntax, homophones, and other interpretive errors are inadvertently transcribed by the software.  Please disregard these errors.  Please excuse any errors that have escaped final proofreading.      Encounter Date: 4/14/2025  History and Physical    Referring provider:  Mohamud Chávez MD   PCP:  Eliot Cha Jr., MD  From:  Minor Grimaldo MD  Thank you.    Assessment & Plan  1. Squamous cell carcinoma in situ, left forearm.  The lesion is currently inflamed, which could potentially obscure the distinction between normal and malignant cells during pathological examination. A reexcision procedure is recommended to ensure complete removal of the cancerous cells. He has been advised to apply Neosporin or bacitracin to the affected area 2 to 3 times daily, leaving it exposed to air. The reexcision procedure will be scheduled for June 2025, following his return from a planned trip. The  will contact him to arrange the appointment.    PROCEDURE  The patient had a lesion resembling a wart on his left forearm excised previously.    Body mass index is 34.72 kg/m².    Allergies:  No Known Allergies     Syed Almazan is a 65 y.o. male   History of Present Illness  The patient is a 65-year-old male who presents for evaluation of squamous cell carcinoma on his left forearm.    He reports the presence of a lesion resembling a wart on his left forearm, which was previously excised. The

## 2025-04-14 NOTE — PROGRESS NOTES
Identified pt with two pt identifiers (name and ). Reviewed chart in preparation for visit and have obtained necessary documentation.    Syed Almazan is a 65 y.o. male  Chief Complaint   Patient presents with    New Patient     Squamous cell carcinoma L forearm, Mohamud White     /82 (BP Site: Left Upper Arm, Patient Position: Sitting, BP Cuff Size: Large Adult)   Pulse 73   Temp 97.2 °F (36.2 °C) (Temporal)   Ht 1.829 m (6')   Wt 116.1 kg (256 lb)   SpO2 96%   BMI 34.72 kg/m²     1. Have you been to the ER, urgent care clinic since your last visit?  Hospitalized since your last visit?no    2. Have you seen or consulted any other health care providers outside of the [facility] since your last visit?  Include any pap smears or colon screening. no

## 2025-04-29 ENCOUNTER — HOSPITAL ENCOUNTER (OUTPATIENT)
Facility: HOSPITAL | Age: 65
Setting detail: SPECIMEN
Discharge: HOME | End: 2025-05-02

## 2025-04-29 ENCOUNTER — PROCEDURE VISIT (OUTPATIENT)
Age: 65
End: 2025-04-29
Payer: MEDICARE

## 2025-04-29 VITALS
WEIGHT: 256 LBS | BODY MASS INDEX: 34.67 KG/M2 | HEIGHT: 72 IN | DIASTOLIC BLOOD PRESSURE: 89 MMHG | SYSTOLIC BLOOD PRESSURE: 138 MMHG | HEART RATE: 73 BPM | OXYGEN SATURATION: 97 % | TEMPERATURE: 97.3 F

## 2025-04-29 DIAGNOSIS — C44.92 SQUAMOUS CELL SKIN CANCER: ICD-10-CM

## 2025-04-29 DIAGNOSIS — C44.92 SQUAMOUS CELL SKIN CANCER: Primary | ICD-10-CM

## 2025-04-29 PROCEDURE — 12032 INTMD RPR S/A/T/EXT 2.6-7.5: CPT | Performed by: SURGERY

## 2025-04-29 PROCEDURE — 11602 EXC TR-EXT MAL+MARG 1.1-2 CM: CPT | Performed by: SURGERY

## 2025-04-29 RX ORDER — HYDROCHLOROTHIAZIDE 50 MG/1
50 TABLET ORAL DAILY
COMMUNITY

## 2025-04-29 RX ORDER — LIDOCAINE HYDROCHLORIDE AND EPINEPHRINE BITARTRATE 20; .01 MG/ML; MG/ML
10 INJECTION, SOLUTION SUBCUTANEOUS ONCE
Status: COMPLETED | OUTPATIENT
Start: 2025-04-29 | End: 2025-04-29

## 2025-04-29 RX ADMIN — LIDOCAINE HYDROCHLORIDE AND EPINEPHRINE BITARTRATE 10 ML: 20; .01 INJECTION, SOLUTION SUBCUTANEOUS at 11:22

## 2025-04-29 ASSESSMENT — PATIENT HEALTH QUESTIONNAIRE - PHQ9
1. LITTLE INTEREST OR PLEASURE IN DOING THINGS: NOT AT ALL
SUM OF ALL RESPONSES TO PHQ QUESTIONS 1-9: 0
2. FEELING DOWN, DEPRESSED OR HOPELESS: NOT AT ALL
SUM OF ALL RESPONSES TO PHQ QUESTIONS 1-9: 0

## 2025-04-29 NOTE — PROGRESS NOTES
Identified pt with two pt identifiers (name and ). Reviewed chart in preparation for visit and have obtained necessary documentation.    Syed Almazan is a 65 y.o. male  Chief Complaint   Patient presents with    Procedure     re-excision of left forearm squamous cell carcinoma     /89 (BP Site: Left Upper Arm, Patient Position: Sitting, BP Cuff Size: Large Adult)   Pulse 73   Temp 97.3 °F (36.3 °C) (Temporal)   Ht 1.829 m (6')   Wt 116.1 kg (256 lb)   SpO2 97%   BMI 34.72 kg/m²     1. Have you been to the ER, urgent care clinic since your last visit?  Hospitalized since your last visit?no    2. Have you seen or consulted any other health care providers outside of the [facility] since your last visit?  Include any pap smears or colon screening. no     [unfilled]  OFFICE PROCEDURE PROGRESS NOTE        Chart reviewed for the following:   NICOLE RECINOS LPN, have reviewed the History, Physical and updated the Allergic reactions for Syed Almazan, 1960     TIME OUT performed immediately prior to start of procedure:   NICOLE RECINOS LPN, have performed the following reviews on Syed Almazan prior to the start of the procedure:            * Patient was identified by name and date of birth   * Agreement on procedure being performed was verified  * Risks and Benefits explained to the patient  * Procedure site verified and marked as necessary  * Patient was positioned for comfort  * Consent was signed and verified     Time: 940      Date of procedure: 2025    Procedure performed by:  Minor Grimaldo MD    Provider assisted by: nicole tuppince LPN    Patient assisted by: self    How tolerated by patient: tolerated the procedure well with no complications    Post Procedural Pain Scale: 0 - No Hurt    Comments:  post procedure instructions via Dr. Grimaldo, specimen obtained

## 2025-04-29 NOTE — PROGRESS NOTES
Excision Procedure Note    Procedure Date: 4/29/2025    Pre-operative diagnosis: Left dorsal forearm squamous cell carcinoma in situ previously biopsied by dermatology.  Post-operative diagnosis: Same  Procedure: Wide local excision of above, the ellipse was approximately 3-1/2 cm in length with 2 mm margins on either side.  Intermediate closure.     Time out at performed by me (see consent form):  * Patient was identified by name and date of birth   * Agreement on procedure being performed was verified  * Risks and Benefits explained to the patient  * Procedure site verified and marked as necessary  * Patient was positioned for comfort  * Consent was signed and verified    Anesthesia: Local    Procedure Details:  The area was prepped and draped in the usual manner.  Local anesthesia was infiltrated into the skin and soft tissues surrounding the lesion.  A transverse elliptical incision was made with the lesion at the center.  This was taken down into subcutaneous tissues with blunt and sharp dissection and the lesion was removed and oriented with a stitch.  The specimen marking stitch was placed laterally in the anatomic position.  It  was sent for pathologic evaluation.  The deep dermal tissues were then reapproximated with interrupted 3-0 Vicryl sutures.  The dermal layer was then reapproximated with buried interrupted 3-0 Vicryl sutures.  A running 3-0 Vicryl was used to approximate the skin edges in the subcuticular layer.  Reinforcing interrupted 3-0 nylon sutures were used at the area of tension at the center.  Glue dressing was applied.      Estimated Blood Loss:  minimal        Disposition:  Procedure well tolerated.  Post-procedure pain scale: 0/10.       Signed By:   Minor Grimaldo MD

## 2025-05-09 ENCOUNTER — OFFICE VISIT (OUTPATIENT)
Age: 65
End: 2025-05-09

## 2025-05-09 VITALS
HEART RATE: 83 BPM | SYSTOLIC BLOOD PRESSURE: 137 MMHG | TEMPERATURE: 97.9 F | HEIGHT: 72 IN | WEIGHT: 256 LBS | BODY MASS INDEX: 34.67 KG/M2 | OXYGEN SATURATION: 97 % | DIASTOLIC BLOOD PRESSURE: 86 MMHG

## 2025-05-09 DIAGNOSIS — Z48.89 POSTOPERATIVE VISIT: Primary | ICD-10-CM

## 2025-05-09 PROCEDURE — 99024 POSTOP FOLLOW-UP VISIT: CPT | Performed by: SURGERY

## 2025-05-09 RX ORDER — BISOPROLOL FUMARATE 10 MG/1
10 TABLET, FILM COATED ORAL DAILY
COMMUNITY
Start: 2025-04-11

## 2025-05-09 ASSESSMENT — PATIENT HEALTH QUESTIONNAIRE - PHQ9
SUM OF ALL RESPONSES TO PHQ QUESTIONS 1-9: 0
1. LITTLE INTEREST OR PLEASURE IN DOING THINGS: NOT AT ALL
SUM OF ALL RESPONSES TO PHQ QUESTIONS 1-9: 0
2. FEELING DOWN, DEPRESSED OR HOPELESS: NOT AT ALL
SUM OF ALL RESPONSES TO PHQ QUESTIONS 1-9: 0
SUM OF ALL RESPONSES TO PHQ QUESTIONS 1-9: 0

## 2025-05-09 NOTE — PROGRESS NOTES
Status post wide local excision of positive margin squamous cell carcinoma in situ on the left dorsal forearm 10 days ago.    No pain.    Pathology came back benign.  No evidence of residual disease.    On exam, the incision is very well-healed.    Assessment plan: Appropriate healing.  No residual disease.  I removed his sutures today.  Told him to call with any concerns.    Thank you.

## 2025-05-09 NOTE — PROGRESS NOTES
Identified pt with two pt identifiers (name and ). Reviewed chart in preparation for visit and have obtained necessary documentation.    Syed Almazan is a 65 y.o. male  Chief Complaint   Patient presents with    Post-Op Check     excision of left forearm squamous cell carcinoma       /86 (BP Site: Right Upper Arm, Patient Position: Sitting, BP Cuff Size: Large Adult)   Pulse 83   Temp 97.9 °F (36.6 °C) (Temporal)   Ht 1.829 m (6')   Wt 116.1 kg (256 lb)   SpO2 97%   BMI 34.72 kg/m²     1. Have you been to the ER, urgent care clinic since your last visit?  Hospitalized since your last visit?no    2. Have you seen or consulted any other health care providers outside of the [facility] since your last visit?  Include any pap smears or colon screening. no

## 2025-06-11 ENCOUNTER — APPOINTMENT (OUTPATIENT)
Facility: HOSPITAL | Age: 65
DRG: 872 | End: 2025-06-11
Payer: MEDICARE

## 2025-06-11 ENCOUNTER — HOSPITAL ENCOUNTER (INPATIENT)
Facility: HOSPITAL | Age: 65
LOS: 3 days | Discharge: HOME | DRG: 872 | End: 2025-06-14
Attending: STUDENT IN AN ORGANIZED HEALTH CARE EDUCATION/TRAINING PROGRAM | Admitting: STUDENT IN AN ORGANIZED HEALTH CARE EDUCATION/TRAINING PROGRAM
Payer: MEDICARE

## 2025-06-11 DIAGNOSIS — R79.89 ELEVATED TROPONIN: ICD-10-CM

## 2025-06-11 DIAGNOSIS — N17.9 AKI (ACUTE KIDNEY INJURY): ICD-10-CM

## 2025-06-11 DIAGNOSIS — J10.1 INFLUENZA A: Primary | ICD-10-CM

## 2025-06-11 PROBLEM — R65.20: Status: ACTIVE | Noted: 2025-06-11

## 2025-06-11 PROBLEM — A41.9: Status: ACTIVE | Noted: 2025-06-11

## 2025-06-11 PROBLEM — N17.8: Status: ACTIVE | Noted: 2025-06-11

## 2025-06-11 LAB
ALBUMIN SERPL-MCNC: 3.3 G/DL (ref 3.5–5)
ALBUMIN/GLOB SERPL: 0.8 (ref 1.1–2.2)
ALP SERPL-CCNC: 77 U/L (ref 45–117)
ALT SERPL-CCNC: 40 U/L (ref 12–78)
ANION GAP SERPL CALC-SCNC: 9 MMOL/L (ref 2–12)
AST SERPL-CCNC: 40 U/L (ref 15–37)
BASOPHILS # BLD: 0.03 K/UL (ref 0–0.1)
BASOPHILS NFR BLD: 0.4 % (ref 0–1)
BILIRUB SERPL-MCNC: 1.3 MG/DL (ref 0.2–1)
BUN SERPL-MCNC: 24 MG/DL (ref 6–20)
BUN/CREAT SERPL: 10 (ref 12–20)
CALCIUM SERPL-MCNC: 8.9 MG/DL (ref 8.5–10.1)
CHLORIDE SERPL-SCNC: 99 MMOL/L (ref 97–108)
CO2 SERPL-SCNC: 26 MMOL/L (ref 21–32)
COMMENT:: NORMAL
COMMENT:: NORMAL
CREAT SERPL-MCNC: 2.35 MG/DL (ref 0.7–1.3)
DIFFERENTIAL METHOD BLD: ABNORMAL
EKG ATRIAL RATE: 111 BPM
EKG DIAGNOSIS: NORMAL
EKG P AXIS: 59 DEGREES
EKG P-R INTERVAL: 194 MS
EKG Q-T INTERVAL: 348 MS
EKG QRS DURATION: 100 MS
EKG QTC CALCULATION (BAZETT): 473 MS
EKG R AXIS: 121 DEGREES
EKG T AXIS: 35 DEGREES
EKG VENTRICULAR RATE: 111 BPM
EOSINOPHIL # BLD: 0.01 K/UL (ref 0–0.4)
EOSINOPHIL NFR BLD: 0.1 % (ref 0–7)
ERYTHROCYTE [DISTWIDTH] IN BLOOD BY AUTOMATED COUNT: 13 % (ref 11.5–14.5)
FLUAV RNA SPEC QL NAA+PROBE: DETECTED
FLUBV RNA SPEC QL NAA+PROBE: NOT DETECTED
GLOBULIN SER CALC-MCNC: 4.4 G/DL (ref 2–4)
GLUCOSE BLD STRIP.AUTO-MCNC: 396 MG/DL (ref 65–117)
GLUCOSE SERPL-MCNC: 280 MG/DL (ref 65–100)
HCT VFR BLD AUTO: 38.9 % (ref 36.6–50.3)
HGB BLD-MCNC: 13.5 G/DL (ref 12.1–17)
IMM GRANULOCYTES # BLD AUTO: 0.05 K/UL (ref 0–0.04)
IMM GRANULOCYTES NFR BLD AUTO: 0.7 % (ref 0–0.5)
LACTATE SERPL-SCNC: 4 MMOL/L (ref 0.4–2)
LYMPHOCYTES # BLD: 1.36 K/UL (ref 0.8–3.5)
LYMPHOCYTES NFR BLD: 19.6 % (ref 12–49)
MAGNESIUM SERPL-MCNC: 1 MG/DL (ref 1.6–2.4)
MCH RBC QN AUTO: 31.8 PG (ref 26–34)
MCHC RBC AUTO-ENTMCNC: 34.7 G/DL (ref 30–36.5)
MCV RBC AUTO: 91.7 FL (ref 80–99)
MONOCYTES # BLD: 1 K/UL (ref 0–1)
MONOCYTES NFR BLD: 14.4 % (ref 5–13)
NEUTS SEG # BLD: 4.49 K/UL (ref 1.8–8)
NEUTS SEG NFR BLD: 64.8 % (ref 32–75)
NRBC # BLD: 0 K/UL (ref 0–0.01)
NRBC BLD-RTO: 0 PER 100 WBC
PHOSPHATE SERPL-MCNC: 0.7 MG/DL (ref 2.6–4.7)
PLATELET # BLD AUTO: 194 K/UL (ref 150–400)
PMV BLD AUTO: 10.3 FL (ref 8.9–12.9)
POTASSIUM SERPL-SCNC: 3.5 MMOL/L (ref 3.5–5.1)
PROT SERPL-MCNC: 7.7 G/DL (ref 6.4–8.2)
RBC # BLD AUTO: 4.24 M/UL (ref 4.1–5.7)
SARS-COV-2 RNA RESP QL NAA+PROBE: NOT DETECTED
SERVICE CMNT-IMP: ABNORMAL
SODIUM SERPL-SCNC: 134 MMOL/L (ref 136–145)
SOURCE: ABNORMAL
SPECIMEN HOLD: NORMAL
TROPONIN I SERPL HS-MCNC: 44 NG/L (ref 0–76)
TROPONIN I SERPL HS-MCNC: 44 NG/L (ref 0–76)
WBC # BLD AUTO: 6.9 K/UL (ref 4.1–11.1)

## 2025-06-11 PROCEDURE — 71046 X-RAY EXAM CHEST 2 VIEWS: CPT

## 2025-06-11 PROCEDURE — 99285 EMERGENCY DEPT VISIT HI MDM: CPT

## 2025-06-11 PROCEDURE — 87636 SARSCOV2 & INF A&B AMP PRB: CPT

## 2025-06-11 PROCEDURE — 94640 AIRWAY INHALATION TREATMENT: CPT

## 2025-06-11 PROCEDURE — 96374 THER/PROPH/DIAG INJ IV PUSH: CPT

## 2025-06-11 PROCEDURE — 6370000000 HC RX 637 (ALT 250 FOR IP): Performed by: PHYSICIAN ASSISTANT

## 2025-06-11 PROCEDURE — 2580000003 HC RX 258: Performed by: STUDENT IN AN ORGANIZED HEALTH CARE EDUCATION/TRAINING PROGRAM

## 2025-06-11 PROCEDURE — 2060000000 HC ICU INTERMEDIATE R&B

## 2025-06-11 PROCEDURE — 2500000003 HC RX 250 WO HCPCS: Performed by: STUDENT IN AN ORGANIZED HEALTH CARE EDUCATION/TRAINING PROGRAM

## 2025-06-11 PROCEDURE — 94669 MECHANICAL CHEST WALL OSCILL: CPT

## 2025-06-11 PROCEDURE — 84100 ASSAY OF PHOSPHORUS: CPT

## 2025-06-11 PROCEDURE — 6360000002 HC RX W HCPCS: Performed by: STUDENT IN AN ORGANIZED HEALTH CARE EDUCATION/TRAINING PROGRAM

## 2025-06-11 PROCEDURE — 82962 GLUCOSE BLOOD TEST: CPT

## 2025-06-11 PROCEDURE — 83605 ASSAY OF LACTIC ACID: CPT

## 2025-06-11 PROCEDURE — 76775 US EXAM ABDO BACK WALL LIM: CPT

## 2025-06-11 PROCEDURE — 6370000000 HC RX 637 (ALT 250 FOR IP): Performed by: STUDENT IN AN ORGANIZED HEALTH CARE EDUCATION/TRAINING PROGRAM

## 2025-06-11 PROCEDURE — 2580000003 HC RX 258: Performed by: PHYSICIAN ASSISTANT

## 2025-06-11 PROCEDURE — 6360000002 HC RX W HCPCS: Performed by: PHYSICIAN ASSISTANT

## 2025-06-11 PROCEDURE — 80053 COMPREHEN METABOLIC PANEL: CPT

## 2025-06-11 PROCEDURE — 84484 ASSAY OF TROPONIN QUANT: CPT

## 2025-06-11 PROCEDURE — 36415 COLL VENOUS BLD VENIPUNCTURE: CPT

## 2025-06-11 PROCEDURE — 83735 ASSAY OF MAGNESIUM: CPT

## 2025-06-11 PROCEDURE — 85025 COMPLETE CBC W/AUTO DIFF WBC: CPT

## 2025-06-11 RX ORDER — ACETAMINOPHEN 500 MG
1000 TABLET ORAL
Status: COMPLETED | OUTPATIENT
Start: 2025-06-11 | End: 2025-06-11

## 2025-06-11 RX ORDER — ASPIRIN 81 MG/1
324 TABLET, CHEWABLE ORAL ONCE
Status: COMPLETED | OUTPATIENT
Start: 2025-06-11 | End: 2025-06-11

## 2025-06-11 RX ORDER — SODIUM CHLORIDE 9 MG/ML
INJECTION, SOLUTION INTRAVENOUS PRN
Status: DISCONTINUED | OUTPATIENT
Start: 2025-06-11 | End: 2025-06-14 | Stop reason: HOSPADM

## 2025-06-11 RX ORDER — CYCLOBENZAPRINE HCL 10 MG
10 TABLET ORAL PRN
Status: DISCONTINUED | OUTPATIENT
Start: 2025-06-11 | End: 2025-06-14 | Stop reason: HOSPADM

## 2025-06-11 RX ORDER — TRAMADOL HYDROCHLORIDE 50 MG/1
75 TABLET ORAL EVERY 6 HOURS PRN
Status: DISCONTINUED | OUTPATIENT
Start: 2025-06-11 | End: 2025-06-14 | Stop reason: HOSPADM

## 2025-06-11 RX ORDER — OSELTAMIVIR PHOSPHATE 6 MG/ML
30 FOR SUSPENSION ORAL DAILY
Status: DISCONTINUED | OUTPATIENT
Start: 2025-06-11 | End: 2025-06-12

## 2025-06-11 RX ORDER — ENOXAPARIN SODIUM 100 MG/ML
30 INJECTION SUBCUTANEOUS 2 TIMES DAILY
Status: DISCONTINUED | OUTPATIENT
Start: 2025-06-11 | End: 2025-06-11

## 2025-06-11 RX ORDER — GUAIFENESIN 200 MG/10ML
400 LIQUID ORAL 4 TIMES DAILY
Status: DISPENSED | OUTPATIENT
Start: 2025-06-11 | End: 2025-06-12

## 2025-06-11 RX ORDER — ACETAMINOPHEN 325 MG/1
650 TABLET ORAL EVERY 6 HOURS PRN
Status: DISCONTINUED | OUTPATIENT
Start: 2025-06-11 | End: 2025-06-14 | Stop reason: HOSPADM

## 2025-06-11 RX ORDER — AMLODIPINE BESYLATE 5 MG/1
10 TABLET ORAL
Status: COMPLETED | OUTPATIENT
Start: 2025-06-11 | End: 2025-06-11

## 2025-06-11 RX ORDER — 0.9 % SODIUM CHLORIDE 0.9 %
1000 INTRAVENOUS SOLUTION INTRAVENOUS ONCE
Status: COMPLETED | OUTPATIENT
Start: 2025-06-11 | End: 2025-06-11

## 2025-06-11 RX ORDER — HEPARIN SODIUM 5000 [USP'U]/ML
5000 INJECTION, SOLUTION INTRAVENOUS; SUBCUTANEOUS EVERY 8 HOURS SCHEDULED
Status: DISCONTINUED | OUTPATIENT
Start: 2025-06-11 | End: 2025-06-14 | Stop reason: HOSPADM

## 2025-06-11 RX ORDER — ATORVASTATIN CALCIUM 10 MG/1
10 TABLET, FILM COATED ORAL NIGHTLY
Status: DISCONTINUED | OUTPATIENT
Start: 2025-06-11 | End: 2025-06-14 | Stop reason: HOSPADM

## 2025-06-11 RX ORDER — SODIUM CHLORIDE, SODIUM LACTATE, POTASSIUM CHLORIDE, AND CALCIUM CHLORIDE .6; .31; .03; .02 G/100ML; G/100ML; G/100ML; G/100ML
500 INJECTION, SOLUTION INTRAVENOUS ONCE
Status: COMPLETED | OUTPATIENT
Start: 2025-06-11 | End: 2025-06-11

## 2025-06-11 RX ORDER — ACETAMINOPHEN 650 MG/1
650 SUPPOSITORY RECTAL EVERY 6 HOURS PRN
Status: DISCONTINUED | OUTPATIENT
Start: 2025-06-11 | End: 2025-06-14 | Stop reason: HOSPADM

## 2025-06-11 RX ORDER — IPRATROPIUM BROMIDE AND ALBUTEROL SULFATE 2.5; .5 MG/3ML; MG/3ML
1 SOLUTION RESPIRATORY (INHALATION)
Status: COMPLETED | OUTPATIENT
Start: 2025-06-11 | End: 2025-06-12

## 2025-06-11 RX ORDER — METOPROLOL SUCCINATE 50 MG/1
100 TABLET, EXTENDED RELEASE ORAL DAILY
Status: DISCONTINUED | OUTPATIENT
Start: 2025-06-11 | End: 2025-06-14 | Stop reason: HOSPADM

## 2025-06-11 RX ORDER — 0.9 % SODIUM CHLORIDE 0.9 %
500 INTRAVENOUS SOLUTION INTRAVENOUS ONCE
Status: COMPLETED | OUTPATIENT
Start: 2025-06-11 | End: 2025-06-11

## 2025-06-11 RX ORDER — GLUCAGON 1 MG/ML
1 KIT INJECTION PRN
Status: DISCONTINUED | OUTPATIENT
Start: 2025-06-11 | End: 2025-06-14 | Stop reason: HOSPADM

## 2025-06-11 RX ORDER — SODIUM CHLORIDE 0.9 % (FLUSH) 0.9 %
5-40 SYRINGE (ML) INJECTION PRN
Status: DISCONTINUED | OUTPATIENT
Start: 2025-06-11 | End: 2025-06-14 | Stop reason: HOSPADM

## 2025-06-11 RX ORDER — SODIUM CHLORIDE, SODIUM LACTATE, POTASSIUM CHLORIDE, CALCIUM CHLORIDE 600; 310; 30; 20 MG/100ML; MG/100ML; MG/100ML; MG/100ML
INJECTION, SOLUTION INTRAVENOUS CONTINUOUS
Status: DISCONTINUED | OUTPATIENT
Start: 2025-06-11 | End: 2025-06-12

## 2025-06-11 RX ORDER — INSULIN LISPRO 100 [IU]/ML
0-4 INJECTION, SOLUTION INTRAVENOUS; SUBCUTANEOUS
Status: DISCONTINUED | OUTPATIENT
Start: 2025-06-11 | End: 2025-06-14 | Stop reason: HOSPADM

## 2025-06-11 RX ORDER — MAGNESIUM SULFATE IN WATER 40 MG/ML
2000 INJECTION, SOLUTION INTRAVENOUS ONCE
Status: COMPLETED | OUTPATIENT
Start: 2025-06-11 | End: 2025-06-11

## 2025-06-11 RX ORDER — SODIUM CHLORIDE 0.9 % (FLUSH) 0.9 %
5-40 SYRINGE (ML) INJECTION EVERY 12 HOURS SCHEDULED
Status: DISCONTINUED | OUTPATIENT
Start: 2025-06-11 | End: 2025-06-14 | Stop reason: HOSPADM

## 2025-06-11 RX ORDER — AMLODIPINE BESYLATE 5 MG/1
10 TABLET ORAL DAILY
Status: DISCONTINUED | OUTPATIENT
Start: 2025-06-12 | End: 2025-06-14 | Stop reason: HOSPADM

## 2025-06-11 RX ORDER — KETOROLAC TROMETHAMINE 30 MG/ML
15 INJECTION, SOLUTION INTRAMUSCULAR; INTRAVENOUS ONCE
Status: COMPLETED | OUTPATIENT
Start: 2025-06-11 | End: 2025-06-11

## 2025-06-11 RX ORDER — DEXTROSE MONOHYDRATE 100 MG/ML
INJECTION, SOLUTION INTRAVENOUS CONTINUOUS PRN
Status: DISCONTINUED | OUTPATIENT
Start: 2025-06-11 | End: 2025-06-14 | Stop reason: HOSPADM

## 2025-06-11 RX ORDER — INSULIN GLARGINE 100 [IU]/ML
0.15 INJECTION, SOLUTION SUBCUTANEOUS DAILY
Status: DISCONTINUED | OUTPATIENT
Start: 2025-06-11 | End: 2025-06-14 | Stop reason: HOSPADM

## 2025-06-11 RX ORDER — ASPIRIN 81 MG/1
81 TABLET ORAL DAILY
Status: DISCONTINUED | OUTPATIENT
Start: 2025-06-12 | End: 2025-06-14 | Stop reason: HOSPADM

## 2025-06-11 RX ADMIN — SODIUM CHLORIDE 500 ML: 0.9 INJECTION, SOLUTION INTRAVENOUS at 21:51

## 2025-06-11 RX ADMIN — IPRATROPIUM BROMIDE AND ALBUTEROL SULFATE 1 DOSE: .5; 3 SOLUTION RESPIRATORY (INHALATION) at 20:50

## 2025-06-11 RX ADMIN — SODIUM CHLORIDE, SODIUM LACTATE, POTASSIUM CHLORIDE, AND CALCIUM CHLORIDE 500 ML: 600; 310; 30; 20 INJECTION, SOLUTION INTRAVENOUS at 17:00

## 2025-06-11 RX ADMIN — SODIUM CHLORIDE, SODIUM LACTATE, POTASSIUM CHLORIDE, AND CALCIUM CHLORIDE: .6; .31; .03; .02 INJECTION, SOLUTION INTRAVENOUS at 22:37

## 2025-06-11 RX ADMIN — GUAIFENESIN 400 MG: 200 SOLUTION ORAL at 16:59

## 2025-06-11 RX ADMIN — SODIUM CHLORIDE, PRESERVATIVE FREE 10 ML: 5 INJECTION INTRAVENOUS at 21:01

## 2025-06-11 RX ADMIN — MAGNESIUM SULFATE HEPTAHYDRATE 2000 MG: 40 INJECTION, SOLUTION INTRAVENOUS at 21:16

## 2025-06-11 RX ADMIN — POTASSIUM PHOSPHATE, MONOBASIC AND POTASSIUM PHOSPHATE, DIBASIC 30 MMOL: 224; 236 INJECTION, SOLUTION, CONCENTRATE INTRAVENOUS at 21:16

## 2025-06-11 RX ADMIN — IPRATROPIUM BROMIDE AND ALBUTEROL SULFATE 1 DOSE: .5; 3 SOLUTION RESPIRATORY (INHALATION) at 16:55

## 2025-06-11 RX ADMIN — KETOROLAC TROMETHAMINE 15 MG: 30 INJECTION, SOLUTION INTRAMUSCULAR at 13:09

## 2025-06-11 RX ADMIN — SODIUM CHLORIDE 1000 ML: 0.9 INJECTION, SOLUTION INTRAVENOUS at 13:10

## 2025-06-11 RX ADMIN — OSELTAMIVIR PHOSPHATE 30 MG: 6 POWDER, FOR SUSPENSION ORAL at 16:59

## 2025-06-11 RX ADMIN — GABAPENTIN 400 MG: 300 CAPSULE ORAL at 20:17

## 2025-06-11 RX ADMIN — GUAIFENESIN 400 MG: 200 SOLUTION ORAL at 20:22

## 2025-06-11 RX ADMIN — METOPROLOL SUCCINATE 100 MG: 50 TABLET, EXTENDED RELEASE ORAL at 20:22

## 2025-06-11 RX ADMIN — HEPARIN SODIUM 5000 UNITS: 5000 INJECTION INTRAVENOUS; SUBCUTANEOUS at 17:00

## 2025-06-11 RX ADMIN — AMLODIPINE BESYLATE 10 MG: 5 TABLET ORAL at 14:22

## 2025-06-11 RX ADMIN — INSULIN LISPRO 4 UNITS: 100 INJECTION, SOLUTION INTRAVENOUS; SUBCUTANEOUS at 20:58

## 2025-06-11 RX ADMIN — ASPIRIN 324 MG: 81 TABLET, CHEWABLE ORAL at 14:22

## 2025-06-11 RX ADMIN — ATORVASTATIN CALCIUM 10 MG: 10 TABLET, FILM COATED ORAL at 20:18

## 2025-06-11 RX ADMIN — ACETAMINOPHEN 1000 MG: 500 TABLET ORAL at 13:09

## 2025-06-11 ASSESSMENT — PAIN DESCRIPTION - DESCRIPTORS: DESCRIPTORS: DISCOMFORT

## 2025-06-11 ASSESSMENT — PAIN DESCRIPTION - ORIENTATION: ORIENTATION: MID

## 2025-06-11 ASSESSMENT — LIFESTYLE VARIABLES
HOW MANY STANDARD DRINKS CONTAINING ALCOHOL DO YOU HAVE ON A TYPICAL DAY: PATIENT DOES NOT DRINK
HOW OFTEN DO YOU HAVE A DRINK CONTAINING ALCOHOL: NEVER

## 2025-06-11 ASSESSMENT — PAIN SCALES - GENERAL
PAINLEVEL_OUTOF10: 4
PAINLEVEL_OUTOF10: 4
PAINLEVEL_OUTOF10: 0

## 2025-06-11 ASSESSMENT — PAIN DESCRIPTION - LOCATION
LOCATION: CHEST
LOCATION: CHEST

## 2025-06-11 ASSESSMENT — PAIN DESCRIPTION - PAIN TYPE: TYPE: ACUTE PAIN

## 2025-06-11 NOTE — H&P
CBC with Auto Differential    Collection Time: 06/11/25 12:29 PM   Result Value Ref Range    WBC 6.9 4.1 - 11.1 K/uL    RBC 4.24 4.10 - 5.70 M/uL    Hemoglobin 13.5 12.1 - 17.0 g/dL    Hematocrit 38.9 36.6 - 50.3 %    MCV 91.7 80.0 - 99.0 FL    MCH 31.8 26.0 - 34.0 PG    MCHC 34.7 30.0 - 36.5 g/dL    RDW 13.0 11.5 - 14.5 %    Platelets 194 150 - 400 K/uL    MPV 10.3 8.9 - 12.9 FL    Nucleated RBCs 0.0 0  WBC    nRBC 0.00 0.00 - 0.01 K/uL    Neutrophils % 64.8 32.0 - 75.0 %    Lymphocytes % 19.6 12.0 - 49.0 %    Monocytes % 14.4 (H) 5.0 - 13.0 %    Eosinophils % 0.1 0.0 - 7.0 %    Basophils % 0.4 0.0 - 1.0 %    Immature Granulocytes % 0.7 (H) 0.0 - 0.5 %    Neutrophils Absolute 4.49 1.80 - 8.00 K/UL    Lymphocytes Absolute 1.36 0.80 - 3.50 K/UL    Monocytes Absolute 1.00 0.00 - 1.00 K/UL    Eosinophils Absolute 0.01 0.00 - 0.40 K/UL    Basophils Absolute 0.03 0.00 - 0.10 K/UL    Immature Granulocytes Absolute 0.05 (H) 0.00 - 0.04 K/UL    Differential Type AUTOMATED     Comprehensive Metabolic Panel    Collection Time: 06/11/25 12:29 PM   Result Value Ref Range    Sodium 134 (L) 136 - 145 mmol/L    Potassium 3.5 3.5 - 5.1 mmol/L    Chloride 99 97 - 108 mmol/L    CO2 26 21 - 32 mmol/L    Anion Gap 9 2 - 12 mmol/L    Glucose 280 (H) 65 - 100 mg/dL    BUN 24 (H) 6 - 20 MG/DL    Creatinine 2.35 (H) 0.70 - 1.30 MG/DL    BUN/Creatinine Ratio 10 (L) 12 - 20      Est, Glom Filt Rate 30 (L) >60 ml/min/1.73m2    Calcium 8.9 8.5 - 10.1 MG/DL    Total Bilirubin 1.3 (H) 0.2 - 1.0 MG/DL    ALT 40 12 - 78 U/L    AST 40 (H) 15 - 37 U/L    Alk Phosphatase 77 45 - 117 U/L    Total Protein 7.7 6.4 - 8.2 g/dL    Albumin 3.3 (L) 3.5 - 5.0 g/dL    Globulin 4.4 (H) 2.0 - 4.0 g/dL    Albumin/Globulin Ratio 0.8 (L) 1.1 - 2.2     Extra Tubes Hold    Collection Time: 06/11/25 12:29 PM   Result Value Ref Range    Specimen HOld SST RED ELAINE     Comment:        Add-on orders for these samples will be processed based on acceptable specimen

## 2025-06-11 NOTE — ED PROVIDER NOTES
[facility] EMERGENCY DEPARTMENT  EMERGENCY DEPARTMENT ENCOUNTER       Pt Name: Syed Almazan  MRN: [id number]  Birthdate 1960  Date of evaluation: 6/11/2025  Provider: HONEY Parker   PCP: Eliot Cha Jr., MD  Note Started: 12:44 PM EDT 6/11/25     CHIEF COMPLAINT       Chief Complaint   Patient presents with    Chest Pain     Pt reports to ed complaining of fever/chills/shortness of breath and some chest pain onset overnight, pt just returned from an [employer] -- positive exposure to someone with Flu        HISTORY OF PRESENT ILLNESS: 1 or more elements      History From: Patient  None     Syed Almazan is a 65 y.o. male pmhx HTN, DM, hypertension, CKD, chronic pain, and additional history as noted below, who presents to the ED for evaluation of acute intermittent chills, generalized bodyaches, fevers, chest pain and shortness of breath since last night.  States he just returned from an [employer] cruise and he was exposed to somebody that was positive for the flu.  Endorses acute onset generalized bodyaches and intermittent chills last night.  Endorses subjective fevers.  States he has had some intermittent chest pain, but states this is with coughing spells.  States these coughing spells also make him feel short of breath.  He otherwise denies leg pain or swelling, increased edema.  Denies prior cardiac history.  States he has not taken any of his daily medications today prior to arrival.     Nursing Notes were all reviewed and agreed with or any disagreements were addressed in the HPI.     REVIEW OF SYSTEMS      Review of Systems   All other systems reviewed and are negative.       Positives and Pertinent negatives as per HPI.    PAST HISTORY     Past Medical History:  Past Medical History:   Diagnosis Date    Adverse effect of anesthesia     woke up during colonoscopy and felt pressure    Arthritis     Chronic kidney disease     has one kidney; born with only one    Chronic pain     back  NORCO     ipratropium 0.5 mg-albuterol 2.5 mg 0.5-2.5 (3) MG/3ML Soln nebulizer solution  Commonly known as: DUONEB     metFORMIN 500 MG extended release tablet  Commonly known as: GLUCOPHAGE-XR     traMADol 300 MG Tb24 extended release tablet  Commonly known as: ULTRAM ER     Trulicity 1.5 MG/0.5ML SC injection  Generic drug: dulaglutide                DISCONTINUED MEDICATIONS:  Current Discharge Medication List            I am the Primary Clinician of Record.   HONEY Parker (electronically signed)    (Please note that parts of this dictation were completed with voice recognition software. Quite often unanticipated grammatical, syntax, homophones, and other interpretive errors are inadvertently transcribed by the computer software. Please disregards these errors. Please excuse any errors that have escaped final proofreading.)         Azalia Quiroz PA  06/11/25 7022

## 2025-06-12 LAB
ANION GAP SERPL CALC-SCNC: 4 MMOL/L (ref 2–12)
ANION GAP SERPL CALC-SCNC: 8 MMOL/L (ref 2–12)
APPEARANCE UR: CLEAR
BACTERIA URNS QL MICRO: NEGATIVE /HPF
BILIRUB UR QL: NEGATIVE
BUN SERPL-MCNC: 24 MG/DL (ref 6–20)
BUN SERPL-MCNC: 28 MG/DL (ref 6–20)
BUN/CREAT SERPL: 11 (ref 12–20)
BUN/CREAT SERPL: 11 (ref 12–20)
CALCIUM SERPL-MCNC: 7.5 MG/DL (ref 8.5–10.1)
CALCIUM SERPL-MCNC: 8.2 MG/DL (ref 8.5–10.1)
CHLORIDE SERPL-SCNC: 101 MMOL/L (ref 97–108)
CHLORIDE SERPL-SCNC: 102 MMOL/L (ref 97–108)
CO2 SERPL-SCNC: 25 MMOL/L (ref 21–32)
CO2 SERPL-SCNC: 29 MMOL/L (ref 21–32)
COLOR UR: ABNORMAL
COMMENT:: NORMAL
CREAT SERPL-MCNC: 2.14 MG/DL (ref 0.7–1.3)
CREAT SERPL-MCNC: 2.52 MG/DL (ref 0.7–1.3)
EPITH CASTS URNS QL MICRO: ABNORMAL /LPF
ERYTHROCYTE [DISTWIDTH] IN BLOOD BY AUTOMATED COUNT: 13.2 % (ref 11.5–14.5)
EST. AVERAGE GLUCOSE BLD GHB EST-MCNC: 160 MG/DL
GLUCOSE BLD STRIP.AUTO-MCNC: 247 MG/DL (ref 65–117)
GLUCOSE BLD STRIP.AUTO-MCNC: 258 MG/DL (ref 65–117)
GLUCOSE BLD STRIP.AUTO-MCNC: 280 MG/DL (ref 65–117)
GLUCOSE BLD STRIP.AUTO-MCNC: 312 MG/DL (ref 65–117)
GLUCOSE BLD STRIP.AUTO-MCNC: 331 MG/DL (ref 65–117)
GLUCOSE BLD STRIP.AUTO-MCNC: 381 MG/DL (ref 65–117)
GLUCOSE SERPL-MCNC: 272 MG/DL (ref 65–100)
GLUCOSE SERPL-MCNC: 274 MG/DL (ref 65–100)
GLUCOSE UR STRIP.AUTO-MCNC: >1000 MG/DL
HBA1C MFR BLD: 7.2 % (ref 4–5.6)
HCT VFR BLD AUTO: 32.7 % (ref 36.6–50.3)
HGB BLD-MCNC: 11.3 G/DL (ref 12.1–17)
HGB UR QL STRIP: ABNORMAL
KETONES UR QL STRIP.AUTO: ABNORMAL MG/DL
LACTATE SERPL-SCNC: 2.2 MMOL/L (ref 0.4–2)
LACTATE SERPL-SCNC: 2.4 MMOL/L (ref 0.4–2)
LACTATE SERPL-SCNC: 3 MMOL/L (ref 0.4–2)
LACTATE SERPL-SCNC: 3.3 MMOL/L (ref 0.4–2)
LEUKOCYTE ESTERASE UR QL STRIP.AUTO: NEGATIVE
MAGNESIUM SERPL-MCNC: 1.4 MG/DL (ref 1.6–2.4)
MAGNESIUM SERPL-MCNC: 1.9 MG/DL (ref 1.6–2.4)
MAGNESIUM SERPL-MCNC: 2.2 MG/DL (ref 1.6–2.4)
MCH RBC QN AUTO: 32.5 PG (ref 26–34)
MCHC RBC AUTO-ENTMCNC: 34.6 G/DL (ref 30–36.5)
MCV RBC AUTO: 94 FL (ref 80–99)
NITRITE UR QL STRIP.AUTO: NEGATIVE
NRBC # BLD: 0 K/UL (ref 0–0.01)
NRBC BLD-RTO: 0 PER 100 WBC
PH UR STRIP: 6 (ref 5–8)
PHOSPHATE SERPL-MCNC: 2.6 MG/DL (ref 2.6–4.7)
PHOSPHATE SERPL-MCNC: 3 MG/DL (ref 2.6–4.7)
PHOSPHATE SERPL-MCNC: 3.3 MG/DL (ref 2.6–4.7)
PLATELET # BLD AUTO: 155 K/UL (ref 150–400)
PMV BLD AUTO: 10.5 FL (ref 8.9–12.9)
POTASSIUM SERPL-SCNC: 2.9 MMOL/L (ref 3.5–5.1)
POTASSIUM SERPL-SCNC: 3.7 MMOL/L (ref 3.5–5.1)
PROT UR STRIP-MCNC: >300 MG/DL
RBC # BLD AUTO: 3.48 M/UL (ref 4.1–5.7)
RBC #/AREA URNS HPF: ABNORMAL /HPF (ref 0–5)
SERVICE CMNT-IMP: ABNORMAL
SODIUM SERPL-SCNC: 134 MMOL/L (ref 136–145)
SODIUM SERPL-SCNC: 135 MMOL/L (ref 136–145)
SP GR UR REFRACTOMETRY: 1.02
SPECIMEN HOLD: NORMAL
URINE CULTURE IF INDICATED: ABNORMAL
UROBILINOGEN UR QL STRIP.AUTO: 0.2 EU/DL (ref 0.2–1)
WBC # BLD AUTO: 6 K/UL (ref 4.1–11.1)
WBC URNS QL MICRO: ABNORMAL /HPF (ref 0–4)

## 2025-06-12 PROCEDURE — 6370000000 HC RX 637 (ALT 250 FOR IP): Performed by: STUDENT IN AN ORGANIZED HEALTH CARE EDUCATION/TRAINING PROGRAM

## 2025-06-12 PROCEDURE — 84100 ASSAY OF PHOSPHORUS: CPT

## 2025-06-12 PROCEDURE — 2060000000 HC ICU INTERMEDIATE R&B

## 2025-06-12 PROCEDURE — 83735 ASSAY OF MAGNESIUM: CPT

## 2025-06-12 PROCEDURE — 83036 HEMOGLOBIN GLYCOSYLATED A1C: CPT

## 2025-06-12 PROCEDURE — 6360000002 HC RX W HCPCS: Performed by: STUDENT IN AN ORGANIZED HEALTH CARE EDUCATION/TRAINING PROGRAM

## 2025-06-12 PROCEDURE — 85027 COMPLETE CBC AUTOMATED: CPT

## 2025-06-12 PROCEDURE — 2580000003 HC RX 258: Performed by: STUDENT IN AN ORGANIZED HEALTH CARE EDUCATION/TRAINING PROGRAM

## 2025-06-12 PROCEDURE — 87040 BLOOD CULTURE FOR BACTERIA: CPT

## 2025-06-12 PROCEDURE — 2500000003 HC RX 250 WO HCPCS: Performed by: STUDENT IN AN ORGANIZED HEALTH CARE EDUCATION/TRAINING PROGRAM

## 2025-06-12 PROCEDURE — 94640 AIRWAY INHALATION TREATMENT: CPT

## 2025-06-12 PROCEDURE — 36415 COLL VENOUS BLD VENIPUNCTURE: CPT

## 2025-06-12 PROCEDURE — 81001 URINALYSIS AUTO W/SCOPE: CPT

## 2025-06-12 PROCEDURE — 83605 ASSAY OF LACTIC ACID: CPT

## 2025-06-12 PROCEDURE — 80048 BASIC METABOLIC PNL TOTAL CA: CPT

## 2025-06-12 PROCEDURE — 82962 GLUCOSE BLOOD TEST: CPT

## 2025-06-12 PROCEDURE — 94669 MECHANICAL CHEST WALL OSCILL: CPT

## 2025-06-12 RX ORDER — POTASSIUM CHLORIDE 750 MG/1
20 TABLET, EXTENDED RELEASE ORAL ONCE
Status: COMPLETED | OUTPATIENT
Start: 2025-06-12 | End: 2025-06-12

## 2025-06-12 RX ORDER — LANOLIN ALCOHOL/MO/W.PET/CERES
400 CREAM (GRAM) TOPICAL ONCE
Status: COMPLETED | OUTPATIENT
Start: 2025-06-12 | End: 2025-06-12

## 2025-06-12 RX ORDER — SODIUM CHLORIDE, SODIUM LACTATE, POTASSIUM CHLORIDE, AND CALCIUM CHLORIDE .6; .31; .03; .02 G/100ML; G/100ML; G/100ML; G/100ML
500 INJECTION, SOLUTION INTRAVENOUS ONCE
Status: COMPLETED | OUTPATIENT
Start: 2025-06-12 | End: 2025-06-12

## 2025-06-12 RX ORDER — OSELTAMIVIR PHOSPHATE 6 MG/ML
30 FOR SUSPENSION ORAL 2 TIMES DAILY
Status: DISCONTINUED | OUTPATIENT
Start: 2025-06-12 | End: 2025-06-14 | Stop reason: HOSPADM

## 2025-06-12 RX ORDER — MAGNESIUM SULFATE IN WATER 40 MG/ML
2000 INJECTION, SOLUTION INTRAVENOUS ONCE
Status: COMPLETED | OUTPATIENT
Start: 2025-06-12 | End: 2025-06-12

## 2025-06-12 RX ADMIN — HEPARIN SODIUM 5000 UNITS: 5000 INJECTION INTRAVENOUS; SUBCUTANEOUS at 14:35

## 2025-06-12 RX ADMIN — Medication 500 MG: at 16:08

## 2025-06-12 RX ADMIN — Medication 500 MG: at 08:58

## 2025-06-12 RX ADMIN — HEPARIN SODIUM 5000 UNITS: 5000 INJECTION INTRAVENOUS; SUBCUTANEOUS at 20:35

## 2025-06-12 RX ADMIN — INSULIN GLARGINE 16 UNITS: 100 INJECTION, SOLUTION SUBCUTANEOUS at 08:57

## 2025-06-12 RX ADMIN — ATORVASTATIN CALCIUM 10 MG: 10 TABLET, FILM COATED ORAL at 20:35

## 2025-06-12 RX ADMIN — HEPARIN SODIUM 5000 UNITS: 5000 INJECTION INTRAVENOUS; SUBCUTANEOUS at 06:02

## 2025-06-12 RX ADMIN — POTASSIUM CHLORIDE 20 MEQ: 750 TABLET, FILM COATED, EXTENDED RELEASE ORAL at 08:57

## 2025-06-12 RX ADMIN — OSELTAMIVIR PHOSPHATE 30 MG: 6 POWDER, FOR SUSPENSION ORAL at 20:35

## 2025-06-12 RX ADMIN — IPRATROPIUM BROMIDE AND ALBUTEROL SULFATE 1 DOSE: .5; 3 SOLUTION RESPIRATORY (INHALATION) at 07:52

## 2025-06-12 RX ADMIN — Medication 400 MG: at 10:22

## 2025-06-12 RX ADMIN — MAGNESIUM SULFATE HEPTAHYDRATE 2000 MG: 40 INJECTION, SOLUTION INTRAVENOUS at 10:31

## 2025-06-12 RX ADMIN — GABAPENTIN 400 MG: 300 CAPSULE ORAL at 20:35

## 2025-06-12 RX ADMIN — GUAIFENESIN 400 MG: 200 SOLUTION ORAL at 08:58

## 2025-06-12 RX ADMIN — INSULIN LISPRO 2 UNITS: 100 INJECTION, SOLUTION INTRAVENOUS; SUBCUTANEOUS at 21:52

## 2025-06-12 RX ADMIN — ASPIRIN 81 MG: 81 TABLET, COATED ORAL at 08:58

## 2025-06-12 RX ADMIN — AMLODIPINE BESYLATE 10 MG: 5 TABLET ORAL at 08:57

## 2025-06-12 RX ADMIN — IPRATROPIUM BROMIDE AND ALBUTEROL SULFATE 1 DOSE: .5; 3 SOLUTION RESPIRATORY (INHALATION) at 02:05

## 2025-06-12 RX ADMIN — SODIUM CHLORIDE, PRESERVATIVE FREE 10 ML: 5 INJECTION INTRAVENOUS at 08:59

## 2025-06-12 RX ADMIN — TRAMADOL HYDROCHLORIDE 75 MG: 50 TABLET, COATED ORAL at 19:33

## 2025-06-12 RX ADMIN — SODIUM CHLORIDE, SODIUM LACTATE, POTASSIUM CHLORIDE, AND CALCIUM CHLORIDE 500 ML: .6; .31; .03; .02 INJECTION, SOLUTION INTRAVENOUS at 06:09

## 2025-06-12 RX ADMIN — INSULIN LISPRO 1 UNITS: 100 INJECTION, SOLUTION INTRAVENOUS; SUBCUTANEOUS at 11:49

## 2025-06-12 RX ADMIN — MELATONIN 3 MG: at 00:33

## 2025-06-12 RX ADMIN — SODIUM CHLORIDE, PRESERVATIVE FREE 10 ML: 5 INJECTION INTRAVENOUS at 20:38

## 2025-06-12 RX ADMIN — GABAPENTIN 400 MG: 300 CAPSULE ORAL at 08:57

## 2025-06-12 RX ADMIN — ACETAMINOPHEN 650 MG: 325 TABLET ORAL at 01:05

## 2025-06-12 RX ADMIN — METOPROLOL SUCCINATE 100 MG: 50 TABLET, EXTENDED RELEASE ORAL at 10:22

## 2025-06-12 RX ADMIN — INSULIN LISPRO 2 UNITS: 100 INJECTION, SOLUTION INTRAVENOUS; SUBCUTANEOUS at 08:56

## 2025-06-12 RX ADMIN — OSELTAMIVIR PHOSPHATE 30 MG: 6 POWDER, FOR SUSPENSION ORAL at 11:50

## 2025-06-12 RX ADMIN — Medication 500 MG: at 18:15

## 2025-06-12 RX ADMIN — INSULIN LISPRO 4 UNITS: 100 INJECTION, SOLUTION INTRAVENOUS; SUBCUTANEOUS at 17:22

## 2025-06-12 RX ADMIN — SODIUM CHLORIDE, SODIUM LACTATE, POTASSIUM CHLORIDE, AND CALCIUM CHLORIDE: .6; .31; .03; .02 INJECTION, SOLUTION INTRAVENOUS at 10:30

## 2025-06-12 ASSESSMENT — PAIN SCALES - GENERAL
PAINLEVEL_OUTOF10: 0
PAINLEVEL_OUTOF10: 0
PAINLEVEL_OUTOF10: 3
PAINLEVEL_OUTOF10: 0
PAINLEVEL_OUTOF10: 3
PAINLEVEL_OUTOF10: 0

## 2025-06-12 NOTE — PROGRESS NOTES
ADULT PROTOCOL: JET AEROSOL ASSESSMENT    Patient  Syed Almazan     65 y.o.   male     6/12/2025  2:41 AM    Breath Sounds Pre Procedure: Breath Sounds Pre-Tx CHERRY: Clear                                  Breath Sounds Pre-Tx LLL: Diminished        Breath Sounds Pre-Tx RUL: Clear        Breath Sounds Pre-Tx RML: Clear        Breath Sounds Pre-Tx RLL: Diminished  Breath Sounds Post Procedure: Breath Sounds Post-Tx CHERRY: Clear          Breath Sounds Post-Tx LLL: Diminished          Breath Sounds Post-Tx RUL: Clear          Breath Sounds Post-Tx RML: Clear          Breath Sounds Post-Tx RLL: Diminished     Heart Rate: Pre procedure Pre-Tx Pulse: 89           Post procedure Post-Tx Pulse: 94    Resp Rate: Pre procedure Pre-Tx Resps: 16           Post procedure Post-Tx Resps: 16    Oxygen: O2 Therapy: Room air        Changed: No    SpO2:  SpO2: 97 %   without Oxygen                Nebulizer Therapy: Current medications Medications: Q6 Albuterol/Ipratropium      Changed: No      Problem List:   Patient Active Problem List   Diagnosis    TIA (transient ischemic attack)    Gallstones    Essential hypertension, malignant    Diabetes mellitus (HCC)    SANDRO (acute kidney injury)    Type 2 diabetes mellitus with hyperglycemia (HCC)    Sepsis with other acute renal failure without septic shock, due to unspecified organism (HCC)       Respiratory Therapist: Alberto Lynch RCP

## 2025-06-12 NOTE — CARE COORDINATION
Care Management Initial Assessment       RUR:  12%  Readmission? No  1st IM letter given? Yes - 6/11/25  1st  letter given: No         06/12/25 0933   Service Assessment   Patient Orientation Other (see comment)  (info obtained from pt's spouse who answered pt's room phone)   Cognition Alert   History Provided By Spouse   Primary Caregiver Self   Support Systems Spouse/Significant Other   Patient's Healthcare Decision Maker is: Legal Next of Kin   PCP Verified by CM Yes  (Dr. Eliot Cha)   Last Visit to PCP Within last 3 months  (a couple weeks ago)   Prior Functional Level Independent in ADLs/IADLs   Current Functional Level Assistance with the following:   Can patient return to prior living arrangement Yes   Ability to make needs known: Other (see comment)  (info obtained from spouse)   Family able to assist with home care needs: Yes  (spouse)   Would you like for me to discuss the discharge plan with any other family members/significant others, and if so, who? Yes  (spouse)   Financial Resources Medicare   Community Resources None   Social/Functional History   Lives With Spouse   Type of Home House  (one floor with three steps to enter)   Home Layout One level   Home Equipment None   Active  Yes   Discharge Planning   Type of Residence House         CM called pt's room and pt's wife answered.  CM introduced self/role, verified demographics and completed initial assessment.  Pt last saw his PCP Dr. Eliot Cha a couple wks ago.  Pt is independent with ADLs/IADLs at baseline to include driving.  Pt does not use any DME.  Pt uses the [facility] on [facility].  Pt denied a hx of HH, SNF or IPR.  Pt's spouse will transport at d/c. Spouse confirmed Full Code status.    Advance Care Planning     General Advance Care Planning (ACP) Conversation    Date of Conversation: 6/12/2025  Conducted with: Legal next of kin, spouse  Other persons present:  pt    Healthcare Decision Maker: No healthcare decision  makers have been documented.     Today we documented Decision Maker(s) consistent with Legal Next of Kin hierarchy.  Content/Action Overview:  DECLINED ACP Conversation - will revisit periodically  Reviewed DNR/DNI and patient elects Full Code (Attempt Resuscitation)      Length of Voluntary ACP Conversation in minutes:  <16 minutes (Non-Billable)    Ana Ortiz, PARTH, LCSW  Care Management, [facility]  [phone]

## 2025-06-12 NOTE — PLAN OF CARE
Problem: Respiratory - Adult  Goal: Achieves optimal ventilation and oxygenation  6/12/2025 1521 by Ladonna Mercado RN  Outcome: Progressing  6/12/2025 0755 by Gabby Stoll RCP  Outcome: Progressing  Flowsheets (Taken 6/12/2025 0745 by Ladonna Mercado RN)  Achieves optimal ventilation and oxygenation: Assess for changes in respiratory status  6/12/2025 0206 by Chau Olea RN  Outcome: Progressing     Problem: Chronic Conditions and Co-morbidities  Goal: Patient's chronic conditions and co-morbidity symptoms are monitored and maintained or improved  6/12/2025 1521 by Ladonna Mercado RN  Outcome: Progressing  Flowsheets (Taken 6/12/2025 0745)  Care Plan - Patient's Chronic Conditions and Co-Morbidity Symptoms are Monitored and Maintained or Improved: Monitor and assess patient's chronic conditions and comorbid symptoms for stability, deterioration, or improvement  6/12/2025 0206 by Chau Olea RN  Outcome: Progressing     Problem: Discharge Planning  Goal: Discharge to home or other facility with appropriate resources  6/12/2025 1521 by Ladonna Mercado RN  Outcome: Progressing  Flowsheets (Taken 6/12/2025 0745)  Discharge to home or other facility with appropriate resources: Identify barriers to discharge with patient and caregiver  6/12/2025 0206 by Chau Olea RN  Outcome: Progressing     Problem: Pain  Goal: Verbalizes/displays adequate comfort level or baseline comfort level  6/12/2025 1521 by Ladonna Mercado RN  Outcome: Progressing  Flowsheets  Taken 6/12/2025 1200  Verbalizes/displays adequate comfort level or baseline comfort level: Encourage patient to monitor pain and request assistance  Taken 6/12/2025 0745  Verbalizes/displays adequate comfort level or baseline comfort level: Encourage patient to monitor pain and request assistance  6/12/2025 0206 by Chau Olea RN  Outcome: Progressing     Problem: Safety - Adult  Goal: Free from fall injury  6/12/2025 1521 by Jess

## 2025-06-12 NOTE — PLAN OF CARE
Problem: Respiratory - Adult  Goal: Achieves optimal ventilation and oxygenation  6/12/2025 0206 by Chau Olea RN  Outcome: Progressing  6/11/2025 2300 by Alberto Lynch RCP  Outcome: Progressing  6/11/2025 2054 by Alberto Lynch RCP  Outcome: Progressing  6/11/2025 1658 by Conchita Hager, RT  Outcome: Progressing     Problem: Chronic Conditions and Co-morbidities  Goal: Patient's chronic conditions and co-morbidity symptoms are monitored and maintained or improved  Outcome: Progressing     Problem: Discharge Planning  Goal: Discharge to home or other facility with appropriate resources  Outcome: Progressing     Problem: Pain  Goal: Verbalizes/displays adequate comfort level or baseline comfort level  Outcome: Progressing     Problem: Safety - Adult  Goal: Free from fall injury  Outcome: Progressing

## 2025-06-12 NOTE — PROGRESS NOTES
ADULT PROTOCOL: JET AEROSOL ASSESSMENT    Patient  Syed Almazan     65 y.o.   male     6/12/2025  8:12 AM    Breath Sounds Pre Procedure: Breath Sounds Pre-Tx CHERRY: Diminished                                  Breath Sounds Pre-Tx LLL: Diminished        Breath Sounds Pre-Tx RUL: Diminished        Breath Sounds Pre-Tx RML: Diminished        Breath Sounds Pre-Tx RLL: Diminished  Breath Sounds Post Procedure: Breath Sounds Post-Tx CHERRY: Diminished          Breath Sounds Post-Tx LLL: Diminished          Breath Sounds Post-Tx RUL: Diminished          Breath Sounds Post-Tx RML: Diminished          Breath Sounds Post-Tx RLL: Diminished                                             Heart Rate: Pre procedure Pre-Tx Pulse: 77           Post procedure Post-Tx Pulse: 77    Resp Rate: Pre procedure Pre-Tx Resps: 16           Post procedure Post-Tx Resps: 16    Peak Flow: Pre bronchodilator             Post bronchodilator           Oxygen: O2 Therapy: Room air   nasal cannula     Changed: No    SpO2:  SpO2: 97 %   without Oxygen                Nebulizer Therapy: Current medications Medications: Albuterol/Ipratropium      Changed: No      Problem List:   Patient Active Problem List   Diagnosis    TIA (transient ischemic attack)    Gallstones    Essential hypertension, malignant    Diabetes mellitus (HCC)    SANDRO (acute kidney injury)    Type 2 diabetes mellitus with hyperglycemia (HCC)    Sepsis with other acute renal failure without septic shock, due to unspecified organism (HCC)       Respiratory Therapist: ASHLEY CEDENO RCP

## 2025-06-12 NOTE — PROGRESS NOTES
Hospitalist Progress Note    NAME:   Syed Almazan   : 1960   MRN: [id number]     Date/Time: 2025 7:08 PM  Patient PCP: Eliot Cha Jr., MD    Estimated discharge date:   Barriers: blood cx clearance, renal improvement      Assessment / Plan:    Flu infection  Sepsis from flu infection  Lactic acidosis - improved  - Symptoms of fevers, chills, shortness of breath  - Tachycardic 109, tachypneic in 20s, hypertensive 200s systolic  - Respiratory panel positive for flu influenza A  -CXR negative for acute pathology  - CBC negative for leukocytosis  - Troponin 44  - CMP shows hyponatremia, elevated creatinine  -Continue trending troponins  - Supportive care DuoNebs, stopped IV fluids  -Pulm hygiene, Tamiflu   - blood cx neg thus far  Lactate downtrended to 2.2     Hypertensive urgency - improved  Essential hypertension  - BP POA in the 200s  - Has not taken his medications this morning  -- Repeat BP improved to the 150s  -Resume home medications, hydralazine as needed 160 SBP     SANDRO on CKD stage III  Hyponatremia  Severe hypophosphatemia  Severe hypomagnesemia  -Born with single kidney  - POA creatinine 2.35 with a baseline 1.38 back in   -UA neg for infection. renal ultrasound - normal solitary L kidney, no hydro  --monitor electrolytes, replace as needed, avoid nephrotoxins  - Cr improving     DM2  HTN  CKD  -C/W home medication  - Start Lantus insulin, sliding scale     Code Status: Full  DVT Prophylaxis: Lovenox  Baseline: Independent      Medical Decision Making:    [x] High (any 2)     A. Problems (any 1)  [x] Acute/Chronic Illness/injury posing threat to life or bodily function:  sepsis  [] Severe exacerbation of chronic illness:    ---------------------------------------------------------------------  B. Risk of Treatment (any 1)   [x] Drugs/treatments that require intensive monitoring for toxicity include:    [] IV ABX requiring serial renal monitoring for nephrotoxicity:     []  114 20 96 %   06/11/25 2104 -- -- -- (!) 114 20 96 %   06/11/25 2103 -- -- -- (!) 112 20 96 %   06/11/25 2050 -- -- -- (!) 113 20 97 %   06/11/25 2010 (!) 176/84 98.2 °F (36.8 °C) Oral 100 18 100 %         Intake/Output Summary (Last 24 hours) at 6/12/2025 1908  Last data filed at 6/12/2025 1818  Gross per 24 hour   Intake 504.8 ml   Output 500 ml   Net 4.8 ml        I had a face to face encounter and independently examined this patient on 6/12/2025, as outlined below:    PHYSICAL EXAM:  General: Alert, cooperative  EENT:  EOMI. Anicteric sclerae.  Resp:  CTA bilaterally, no wheezing or rales.  No accessory muscle use  CV:  Regular  rate,  No edema  GI:  Soft, Non distended, Non tender.  Neurologic:  Alert and oriented X 3, normal speech,   Skin:  No rashes.  No jaundice    Reviewed most current lab test results and cultures  YES  Reviewed most current radiology test results   YES  Review and summation of old records today    NO  Reviewed patient's current orders and MAR    YES  PMH/SH reviewed - no change compared to H&P    Procedures: see electronic medical records for all procedures/Xrays and details which were not copied into this note but were reviewed prior to creation of Plan.      LABS:  I reviewed today's most current labs and imaging studies.  Pertinent labs include:  Recent Labs     06/11/25  1229 06/12/25  0004   WBC 6.9 6.0   HGB 13.5 11.3*   HCT 38.9 32.7*    155     Recent Labs     06/11/25  1229 06/12/25  0004 06/12/25  0419 06/12/25  1551   *  --  134* 135*   K 3.5  --  2.9* 3.7   CL 99  --  101 102   CO2 26  --  25 29   GLUCOSE 280*  --  272* 274*   BUN 24*  --  28* 24*   CREATININE 2.35*  --  2.52* 2.14*   CALCIUM 8.9  --  7.5* 8.2*   MG 1.0* 2.2 1.4* 1.9   PHOS 0.7* 2.6 3.3 3.0   BILITOT 1.3*  --   --   --    AST 40*  --   --   --    ALT 40  --   --   --        Signed: David L Mendel, MD

## 2025-06-13 LAB
ACB COMPLEX DNA BLD POS QL NAA+NON-PROBE: NOT DETECTED
ACCESSION NUMBER, LLC1M: ABNORMAL
ANION GAP SERPL CALC-SCNC: 5 MMOL/L (ref 2–12)
ANION GAP SERPL CALC-SCNC: 6 MMOL/L (ref 2–12)
B FRAGILIS DNA BLD POS QL NAA+NON-PROBE: NOT DETECTED
BIOFIRE TEST COMMENT: ABNORMAL
BUN SERPL-MCNC: 23 MG/DL (ref 6–20)
BUN SERPL-MCNC: 24 MG/DL (ref 6–20)
BUN/CREAT SERPL: 12 (ref 12–20)
BUN/CREAT SERPL: 13 (ref 12–20)
C ALBICANS DNA BLD POS QL NAA+NON-PROBE: NOT DETECTED
C AURIS DNA BLD POS QL NAA+NON-PROBE: NOT DETECTED
C GATTII+NEOFOR DNA BLD POS QL NAA+N-PRB: NOT DETECTED
C GLABRATA DNA BLD POS QL NAA+NON-PROBE: NOT DETECTED
C KRUSEI DNA BLD POS QL NAA+NON-PROBE: NOT DETECTED
C PARAP DNA BLD POS QL NAA+NON-PROBE: NOT DETECTED
C TROPICLS DNA BLD POS QL NAA+NON-PROBE: NOT DETECTED
CALCIUM SERPL-MCNC: 7.9 MG/DL (ref 8.5–10.1)
CALCIUM SERPL-MCNC: 7.9 MG/DL (ref 8.5–10.1)
CHLORIDE SERPL-SCNC: 103 MMOL/L (ref 97–108)
CHLORIDE SERPL-SCNC: 104 MMOL/L (ref 97–108)
CO2 SERPL-SCNC: 27 MMOL/L (ref 21–32)
CO2 SERPL-SCNC: 28 MMOL/L (ref 21–32)
CREAT SERPL-MCNC: 1.88 MG/DL (ref 0.7–1.3)
CREAT SERPL-MCNC: 1.92 MG/DL (ref 0.7–1.3)
E CLOAC COMP DNA BLD POS NAA+NON-PROBE: NOT DETECTED
E COLI DNA BLD POS QL NAA+NON-PROBE: NOT DETECTED
E FAECALIS DNA BLD POS QL NAA+NON-PROBE: NOT DETECTED
E FAECIUM DNA BLD POS QL NAA+NON-PROBE: NOT DETECTED
ENTEROBACTERALES DNA BLD POS NAA+N-PRB: NOT DETECTED
ERYTHROCYTE [DISTWIDTH] IN BLOOD BY AUTOMATED COUNT: 13.1 % (ref 11.5–14.5)
GLUCOSE BLD STRIP.AUTO-MCNC: 167 MG/DL (ref 65–117)
GLUCOSE BLD STRIP.AUTO-MCNC: 202 MG/DL (ref 65–117)
GLUCOSE BLD STRIP.AUTO-MCNC: 212 MG/DL (ref 65–117)
GLUCOSE BLD STRIP.AUTO-MCNC: 271 MG/DL (ref 65–117)
GLUCOSE BLD STRIP.AUTO-MCNC: 272 MG/DL (ref 65–117)
GLUCOSE BLD STRIP.AUTO-MCNC: NORMAL MG/DL (ref 65–117)
GLUCOSE SERPL-MCNC: 165 MG/DL (ref 65–100)
GLUCOSE SERPL-MCNC: 244 MG/DL (ref 65–100)
GP B STREP DNA BLD POS QL NAA+NON-PROBE: NOT DETECTED
HAEM INFLU DNA BLD POS QL NAA+NON-PROBE: NOT DETECTED
HCT VFR BLD AUTO: 32.4 % (ref 36.6–50.3)
HGB BLD-MCNC: 10.9 G/DL (ref 12.1–17)
K OXYTOCA DNA BLD POS QL NAA+NON-PROBE: NOT DETECTED
KLEBSIELLA SP DNA BLD POS QL NAA+NON-PRB: NOT DETECTED
KLEBSIELLA SP DNA BLD POS QL NAA+NON-PRB: NOT DETECTED
L MONOCYTOG DNA BLD POS QL NAA+NON-PROBE: NOT DETECTED
LACTATE SERPL-SCNC: 0.9 MMOL/L (ref 0.4–2)
MAGNESIUM SERPL-MCNC: 1.4 MG/DL (ref 1.6–2.4)
MAGNESIUM SERPL-MCNC: 1.9 MG/DL (ref 1.6–2.4)
MCH RBC QN AUTO: 31.9 PG (ref 26–34)
MCHC RBC AUTO-ENTMCNC: 33.6 G/DL (ref 30–36.5)
MCV RBC AUTO: 94.7 FL (ref 80–99)
MECA+MECC ISLT/SPM QL: NOT DETECTED
N MEN DNA BLD POS QL NAA+NON-PROBE: NOT DETECTED
NRBC # BLD: 0 K/UL (ref 0–0.01)
NRBC BLD-RTO: 0 PER 100 WBC
P AERUGINOSA DNA BLD POS NAA+NON-PROBE: NOT DETECTED
PHOSPHATE SERPL-MCNC: 3.6 MG/DL (ref 2.6–4.7)
PLATELET # BLD AUTO: 161 K/UL (ref 150–400)
PMV BLD AUTO: 10.3 FL (ref 8.9–12.9)
POTASSIUM SERPL-SCNC: 3.1 MMOL/L (ref 3.5–5.1)
POTASSIUM SERPL-SCNC: 3.9 MMOL/L (ref 3.5–5.1)
PROTEUS SP DNA BLD POS QL NAA+NON-PROBE: NOT DETECTED
RBC # BLD AUTO: 3.42 M/UL (ref 4.1–5.7)
RESISTANT GENE TARGETS: ABNORMAL
S AUREUS DNA BLD POS QL NAA+NON-PROBE: NOT DETECTED
S AUREUS+CONS DNA BLD POS NAA+NON-PROBE: DETECTED
S EPIDERMIDIS DNA BLD POS QL NAA+NON-PRB: DETECTED
S LUGDUNENSIS DNA BLD POS QL NAA+NON-PRB: NOT DETECTED
S MALTOPHILIA DNA BLD POS QL NAA+NON-PRB: NOT DETECTED
S MARCESCENS DNA BLD POS NAA+NON-PROBE: NOT DETECTED
S PNEUM DNA BLD POS QL NAA+NON-PROBE: NOT DETECTED
S PYO DNA BLD POS QL NAA+NON-PROBE: NOT DETECTED
SALMONELLA DNA BLD POS QL NAA+NON-PROBE: NOT DETECTED
SERVICE CMNT-IMP: ABNORMAL
SERVICE CMNT-IMP: NORMAL
SODIUM SERPL-SCNC: 136 MMOL/L (ref 136–145)
SODIUM SERPL-SCNC: 137 MMOL/L (ref 136–145)
STREPTOCOCCUS DNA BLD POS NAA+NON-PROBE: DETECTED
WBC # BLD AUTO: 4.9 K/UL (ref 4.1–11.1)

## 2025-06-13 PROCEDURE — 82962 GLUCOSE BLOOD TEST: CPT

## 2025-06-13 PROCEDURE — 2060000000 HC ICU INTERMEDIATE R&B

## 2025-06-13 PROCEDURE — 6370000000 HC RX 637 (ALT 250 FOR IP): Performed by: STUDENT IN AN ORGANIZED HEALTH CARE EDUCATION/TRAINING PROGRAM

## 2025-06-13 PROCEDURE — 6360000002 HC RX W HCPCS: Performed by: STUDENT IN AN ORGANIZED HEALTH CARE EDUCATION/TRAINING PROGRAM

## 2025-06-13 PROCEDURE — 83605 ASSAY OF LACTIC ACID: CPT

## 2025-06-13 PROCEDURE — 80048 BASIC METABOLIC PNL TOTAL CA: CPT

## 2025-06-13 PROCEDURE — 2500000003 HC RX 250 WO HCPCS: Performed by: STUDENT IN AN ORGANIZED HEALTH CARE EDUCATION/TRAINING PROGRAM

## 2025-06-13 PROCEDURE — 83735 ASSAY OF MAGNESIUM: CPT

## 2025-06-13 PROCEDURE — 84100 ASSAY OF PHOSPHORUS: CPT

## 2025-06-13 PROCEDURE — 87040 BLOOD CULTURE FOR BACTERIA: CPT

## 2025-06-13 PROCEDURE — 85027 COMPLETE CBC AUTOMATED: CPT

## 2025-06-13 PROCEDURE — 36415 COLL VENOUS BLD VENIPUNCTURE: CPT

## 2025-06-13 RX ORDER — POTASSIUM CHLORIDE 750 MG/1
20 TABLET, EXTENDED RELEASE ORAL ONCE
Status: COMPLETED | OUTPATIENT
Start: 2025-06-13 | End: 2025-06-13

## 2025-06-13 RX ORDER — HYDRALAZINE HYDROCHLORIDE 25 MG/1
25 TABLET, FILM COATED ORAL EVERY 8 HOURS SCHEDULED
Status: DISCONTINUED | OUTPATIENT
Start: 2025-06-13 | End: 2025-06-14 | Stop reason: HOSPADM

## 2025-06-13 RX ORDER — MAGNESIUM SULFATE IN WATER 40 MG/ML
2000 INJECTION, SOLUTION INTRAVENOUS ONCE
Status: COMPLETED | OUTPATIENT
Start: 2025-06-13 | End: 2025-06-13

## 2025-06-13 RX ORDER — LANOLIN ALCOHOL/MO/W.PET/CERES
400 CREAM (GRAM) TOPICAL ONCE
Status: COMPLETED | OUTPATIENT
Start: 2025-06-13 | End: 2025-06-13

## 2025-06-13 RX ADMIN — METOPROLOL SUCCINATE 100 MG: 50 TABLET, EXTENDED RELEASE ORAL at 09:58

## 2025-06-13 RX ADMIN — INSULIN GLARGINE 16 UNITS: 100 INJECTION, SOLUTION SUBCUTANEOUS at 09:59

## 2025-06-13 RX ADMIN — WATER 2000 MG: 1 INJECTION INTRAMUSCULAR; INTRAVENOUS; SUBCUTANEOUS at 16:53

## 2025-06-13 RX ADMIN — SODIUM CHLORIDE, PRESERVATIVE FREE 10 ML: 5 INJECTION INTRAVENOUS at 20:24

## 2025-06-13 RX ADMIN — AMLODIPINE BESYLATE 10 MG: 5 TABLET ORAL at 09:58

## 2025-06-13 RX ADMIN — HYDRALAZINE HYDROCHLORIDE 25 MG: 25 TABLET ORAL at 10:19

## 2025-06-13 RX ADMIN — HEPARIN SODIUM 5000 UNITS: 5000 INJECTION INTRAVENOUS; SUBCUTANEOUS at 14:18

## 2025-06-13 RX ADMIN — SODIUM CHLORIDE, PRESERVATIVE FREE 10 ML: 5 INJECTION INTRAVENOUS at 09:57

## 2025-06-13 RX ADMIN — HYDRALAZINE HYDROCHLORIDE 25 MG: 25 TABLET ORAL at 20:15

## 2025-06-13 RX ADMIN — Medication 400 MG: at 09:58

## 2025-06-13 RX ADMIN — OSELTAMIVIR PHOSPHATE 30 MG: 6 POWDER, FOR SUSPENSION ORAL at 10:19

## 2025-06-13 RX ADMIN — INSULIN LISPRO 2 UNITS: 100 INJECTION, SOLUTION INTRAVENOUS; SUBCUTANEOUS at 21:08

## 2025-06-13 RX ADMIN — GABAPENTIN 400 MG: 300 CAPSULE ORAL at 09:58

## 2025-06-13 RX ADMIN — ASPIRIN 81 MG: 81 TABLET, COATED ORAL at 09:58

## 2025-06-13 RX ADMIN — HEPARIN SODIUM 5000 UNITS: 5000 INJECTION INTRAVENOUS; SUBCUTANEOUS at 05:27

## 2025-06-13 RX ADMIN — MAGNESIUM SULFATE HEPTAHYDRATE 2000 MG: 40 INJECTION, SOLUTION INTRAVENOUS at 10:14

## 2025-06-13 RX ADMIN — POTASSIUM CHLORIDE 20 MEQ: 750 TABLET, FILM COATED, EXTENDED RELEASE ORAL at 09:59

## 2025-06-13 RX ADMIN — GABAPENTIN 400 MG: 300 CAPSULE ORAL at 20:15

## 2025-06-13 RX ADMIN — ATORVASTATIN CALCIUM 10 MG: 10 TABLET, FILM COATED ORAL at 20:15

## 2025-06-13 RX ADMIN — OSELTAMIVIR PHOSPHATE 30 MG: 6 POWDER, FOR SUSPENSION ORAL at 20:16

## 2025-06-13 RX ADMIN — HEPARIN SODIUM 5000 UNITS: 5000 INJECTION INTRAVENOUS; SUBCUTANEOUS at 21:08

## 2025-06-13 RX ADMIN — INSULIN LISPRO 1 UNITS: 100 INJECTION, SOLUTION INTRAVENOUS; SUBCUTANEOUS at 10:00

## 2025-06-13 RX ADMIN — INSULIN LISPRO 2 UNITS: 100 INJECTION, SOLUTION INTRAVENOUS; SUBCUTANEOUS at 16:51

## 2025-06-13 RX ADMIN — TRAMADOL HYDROCHLORIDE 75 MG: 50 TABLET, COATED ORAL at 20:15

## 2025-06-13 ASSESSMENT — PAIN SCALES - GENERAL
PAINLEVEL_OUTOF10: 0

## 2025-06-13 ASSESSMENT — PAIN - FUNCTIONAL ASSESSMENT: PAIN_FUNCTIONAL_ASSESSMENT: ACTIVITIES ARE NOT PREVENTED

## 2025-06-13 ASSESSMENT — PAIN DESCRIPTION - LOCATION: LOCATION: BACK

## 2025-06-13 NOTE — PLAN OF CARE
Problem: Respiratory - Adult  Goal: Achieves optimal ventilation and oxygenation  6/13/2025 1209 by Rubi Garcia RN  Outcome: Progressing  6/12/2025 2227 by Lisa Dillon RN  Outcome: Progressing     Problem: Chronic Conditions and Co-morbidities  Goal: Patient's chronic conditions and co-morbidity symptoms are monitored and maintained or improved  6/13/2025 1209 by Rubi Garcia RN  Outcome: Progressing  6/12/2025 2227 by Lisa Dillon RN  Outcome: Progressing     Problem: Discharge Planning  Goal: Discharge to home or other facility with appropriate resources  6/13/2025 1209 by Rubi Garcia RN  Outcome: Progressing  6/12/2025 2227 by Lisa Dillon RN  Outcome: Progressing     Problem: Pain  Goal: Verbalizes/displays adequate comfort level or baseline comfort level  6/13/2025 1209 by Rubi Garcia RN  Outcome: Progressing  6/12/2025 2227 by Lisa Dillon RN  Outcome: Progressing  Flowsheets (Taken 6/12/2025 1608 by Ladonna Mercado, RN)  Verbalizes/displays adequate comfort level or baseline comfort level: Encourage patient to monitor pain and request assistance     Problem: Safety - Adult  Goal: Free from fall injury  6/13/2025 1209 by Rubi Garcia RN  Outcome: Progressing  6/12/2025 2227 by Lisa Dillon RN  Outcome: Progressing

## 2025-06-13 NOTE — PROGRESS NOTES
End of Shift Note    Bedside shift change report given to Grazyna NARANJO  (oncoming nurse) by Rubi Garcia RN (offgoing nurse).  Report included the following information SBAR and MAR    Shift worked:  Days      Shift summary and any significant changes:     0855 Critical results gram positive cocci MD Mendel notified      Concerns for physician to address:       Zone phone for oncoming shift:          Activity:  Level of Assistance: Independent  Number times ambulated in hallways past shift: 0  Number of times OOB to chair past shift: 5    Cardiac:   Cardiac Monitoring: Yes      Cardiac Rhythm: Sinus rhythm, 1° AV Block    Access:  Current line(s): PIV     Genitourinary:        Respiratory:   O2 Device: None (Room air)  Chronic home O2 use?: NO  Incentive spirometer at bedside: N/A    GI:  Last BM (including prior to admit): 06/12/25  Current diet:  ADULT DIET; Regular; 3 carb choices (45 gm/meal)  Passing flatus: YES    Pain Management:   Patient states pain is manageable on current regimen: N/A    Skin:  Brooks Scale Score: 22  Interventions: Wound Offloading (Prevention Methods): Repositioning, Pillows    Patient Safety:  Fall Risk: Nursing Judgement-Fall Risk High(Add Comments): Yes  Fall Risk Interventions  Nursing Judgement-Fall Risk High(Add Comments): Yes  Toilet Every 2 Hours-In Advance of Need: Yes  Hourly Visual Checks: Awake, In bed, Quiet  Fall Visual Posted: Socks  Room Door Open: Deferred for droplet isolation  Alarm On: Bed, Chair  Patient Moved Closer to Nursing Station: No    Active Consults:   None    Length of Stay:  Expected LOS: 3  Actual LOS: 2    Rubi Garcia RN

## 2025-06-13 NOTE — PROGRESS NOTES
Attempted to schedule hospital follow up PCP appointment. Office  will like for patient to reach out to make a PCP appointment per office protocol. Pending patient discharge. Camila Dooley, Care Management Assistant

## 2025-06-13 NOTE — PLAN OF CARE
Problem: Chronic Conditions and Co-morbidities  Goal: Patient's chronic conditions and co-morbidity symptoms are monitored and maintained or improved  6/12/2025 2227 by Lisa Dillon RN  Outcome: Progressing  6/12/2025 1521 by Ladonna Mercado RN  Outcome: Progressing  Flowsheets (Taken 6/12/2025 0745)  Care Plan - Patient's Chronic Conditions and Co-Morbidity Symptoms are Monitored and Maintained or Improved: Monitor and assess patient's chronic conditions and comorbid symptoms for stability, deterioration, or improvement     Problem: Discharge Planning  Goal: Discharge to home or other facility with appropriate resources  6/12/2025 2227 by Lisa Dillon RN  Outcome: Progressing  6/12/2025 1521 by Ladonna Mercado RN  Outcome: Progressing  Flowsheets (Taken 6/12/2025 0745)  Discharge to home or other facility with appropriate resources: Identify barriers to discharge with patient and caregiver     Problem: Pain  Goal: Verbalizes/displays adequate comfort level or baseline comfort level  6/12/2025 2227 by Lisa Dillon RN  Outcome: Progressing  Flowsheets (Taken 6/12/2025 1608 by Ladonna Mercado RN)  Verbalizes/displays adequate comfort level or baseline comfort level: Encourage patient to monitor pain and request assistance  6/12/2025 1521 by Ladonna Mercado RN  Outcome: Progressing  Flowsheets  Taken 6/12/2025 1200  Verbalizes/displays adequate comfort level or baseline comfort level: Encourage patient to monitor pain and request assistance  Taken 6/12/2025 0745  Verbalizes/displays adequate comfort level or baseline comfort level: Encourage patient to monitor pain and request assistance     Problem: Safety - Adult  Goal: Free from fall injury  6/12/2025 2227 by Lisa Dillon RN  Outcome: Progressing  6/12/2025 1521 by Ladonna Mercado RN  Outcome: Progressing

## 2025-06-14 VITALS
TEMPERATURE: 98.3 F | RESPIRATION RATE: 18 BRPM | WEIGHT: 238.2 LBS | DIASTOLIC BLOOD PRESSURE: 78 MMHG | HEIGHT: 72 IN | SYSTOLIC BLOOD PRESSURE: 150 MMHG | BODY MASS INDEX: 32.26 KG/M2 | OXYGEN SATURATION: 97 % | HEART RATE: 82 BPM

## 2025-06-14 LAB
ANION GAP SERPL CALC-SCNC: 5 MMOL/L (ref 2–12)
BASOPHILS # BLD: 0.02 K/UL (ref 0–0.1)
BASOPHILS NFR BLD: 0.5 % (ref 0–1)
BUN SERPL-MCNC: 21 MG/DL (ref 6–20)
BUN/CREAT SERPL: 12 (ref 12–20)
CALCIUM SERPL-MCNC: 8.1 MG/DL (ref 8.5–10.1)
CHLORIDE SERPL-SCNC: 108 MMOL/L (ref 97–108)
CO2 SERPL-SCNC: 25 MMOL/L (ref 21–32)
CREAT SERPL-MCNC: 1.79 MG/DL (ref 0.7–1.3)
DIFFERENTIAL METHOD BLD: ABNORMAL
EOSINOPHIL # BLD: 0.08 K/UL (ref 0–0.4)
EOSINOPHIL NFR BLD: 1.8 % (ref 0–7)
ERYTHROCYTE [DISTWIDTH] IN BLOOD BY AUTOMATED COUNT: 13 % (ref 11.5–14.5)
GLUCOSE BLD STRIP.AUTO-MCNC: 139 MG/DL (ref 65–117)
GLUCOSE BLD STRIP.AUTO-MCNC: 304 MG/DL (ref 65–117)
GLUCOSE BLD STRIP.AUTO-MCNC: 305 MG/DL (ref 65–117)
GLUCOSE SERPL-MCNC: 115 MG/DL (ref 65–100)
HCT VFR BLD AUTO: 33.2 % (ref 36.6–50.3)
HGB BLD-MCNC: 11.2 G/DL (ref 12.1–17)
IMM GRANULOCYTES # BLD AUTO: 0.02 K/UL (ref 0–0.04)
IMM GRANULOCYTES NFR BLD AUTO: 0.5 % (ref 0–0.5)
LYMPHOCYTES # BLD: 1.68 K/UL (ref 0.8–3.5)
LYMPHOCYTES NFR BLD: 38.6 % (ref 12–49)
MAGNESIUM SERPL-MCNC: 1.8 MG/DL (ref 1.6–2.4)
MCH RBC QN AUTO: 32.1 PG (ref 26–34)
MCHC RBC AUTO-ENTMCNC: 33.7 G/DL (ref 30–36.5)
MCV RBC AUTO: 95.1 FL (ref 80–99)
MONOCYTES # BLD: 0.49 K/UL (ref 0–1)
MONOCYTES NFR BLD: 11.3 % (ref 5–13)
NEUTS SEG # BLD: 2.06 K/UL (ref 1.8–8)
NEUTS SEG NFR BLD: 47.3 % (ref 32–75)
NRBC # BLD: 0 K/UL (ref 0–0.01)
NRBC BLD-RTO: 0 PER 100 WBC
PHOSPHATE SERPL-MCNC: 3.2 MG/DL (ref 2.6–4.7)
PLATELET # BLD AUTO: 155 K/UL (ref 150–400)
PMV BLD AUTO: 10.2 FL (ref 8.9–12.9)
POTASSIUM SERPL-SCNC: 3.5 MMOL/L (ref 3.5–5.1)
RBC # BLD AUTO: 3.49 M/UL (ref 4.1–5.7)
SERVICE CMNT-IMP: ABNORMAL
SODIUM SERPL-SCNC: 138 MMOL/L (ref 136–145)
WBC # BLD AUTO: 4.4 K/UL (ref 4.1–11.1)

## 2025-06-14 PROCEDURE — 6370000000 HC RX 637 (ALT 250 FOR IP): Performed by: STUDENT IN AN ORGANIZED HEALTH CARE EDUCATION/TRAINING PROGRAM

## 2025-06-14 PROCEDURE — 80048 BASIC METABOLIC PNL TOTAL CA: CPT

## 2025-06-14 PROCEDURE — 6360000002 HC RX W HCPCS: Performed by: STUDENT IN AN ORGANIZED HEALTH CARE EDUCATION/TRAINING PROGRAM

## 2025-06-14 PROCEDURE — 85025 COMPLETE CBC W/AUTO DIFF WBC: CPT

## 2025-06-14 PROCEDURE — 6360000002 HC RX W HCPCS

## 2025-06-14 PROCEDURE — 84100 ASSAY OF PHOSPHORUS: CPT

## 2025-06-14 PROCEDURE — 82962 GLUCOSE BLOOD TEST: CPT

## 2025-06-14 PROCEDURE — 2500000003 HC RX 250 WO HCPCS: Performed by: STUDENT IN AN ORGANIZED HEALTH CARE EDUCATION/TRAINING PROGRAM

## 2025-06-14 PROCEDURE — 36415 COLL VENOUS BLD VENIPUNCTURE: CPT

## 2025-06-14 PROCEDURE — 83735 ASSAY OF MAGNESIUM: CPT

## 2025-06-14 RX ORDER — AMLODIPINE BESYLATE 10 MG/1
10 TABLET ORAL DAILY
Qty: 30 TABLET | Refills: 0 | Status: SHIPPED | OUTPATIENT
Start: 2025-06-14 | End: 2025-06-14

## 2025-06-14 RX ORDER — OSELTAMIVIR PHOSPHATE 30 MG/1
30 CAPSULE ORAL 2 TIMES DAILY
Qty: 6 CAPSULE | Refills: 0 | Status: SHIPPED | OUTPATIENT
Start: 2025-06-14 | End: 2025-06-17

## 2025-06-14 RX ORDER — HYDRALAZINE HYDROCHLORIDE 20 MG/ML
5 INJECTION INTRAMUSCULAR; INTRAVENOUS EVERY 6 HOURS PRN
Status: DISCONTINUED | OUTPATIENT
Start: 2025-06-14 | End: 2025-06-14 | Stop reason: HOSPADM

## 2025-06-14 RX ORDER — AMLODIPINE BESYLATE 10 MG/1
10 TABLET ORAL DAILY
Qty: 30 TABLET | Refills: 0 | Status: SHIPPED | OUTPATIENT
Start: 2025-06-14 | End: 2025-07-14

## 2025-06-14 RX ORDER — HYDRALAZINE HYDROCHLORIDE 25 MG/1
25 TABLET, FILM COATED ORAL EVERY 8 HOURS SCHEDULED
Qty: 90 TABLET | Refills: 3 | Status: SHIPPED | OUTPATIENT
Start: 2025-06-14

## 2025-06-14 RX ORDER — POLYETHYLENE GLYCOL 3350 17 G/17G
17 POWDER, FOR SOLUTION ORAL DAILY
Status: DISCONTINUED | OUTPATIENT
Start: 2025-06-14 | End: 2025-06-14 | Stop reason: HOSPADM

## 2025-06-14 RX ORDER — HYDRALAZINE HYDROCHLORIDE 25 MG/1
25 TABLET, FILM COATED ORAL EVERY 8 HOURS SCHEDULED
Qty: 90 TABLET | Refills: 3 | Status: SHIPPED | OUTPATIENT
Start: 2025-06-14 | End: 2025-06-14

## 2025-06-14 RX ORDER — OSELTAMIVIR PHOSPHATE 30 MG/1
30 CAPSULE ORAL 2 TIMES DAILY
Qty: 6 CAPSULE | Refills: 0 | Status: SHIPPED | OUTPATIENT
Start: 2025-06-14 | End: 2025-06-14

## 2025-06-14 RX ADMIN — POLYETHYLENE GLYCOL 3350 17 G: 17 POWDER, FOR SOLUTION ORAL at 08:00

## 2025-06-14 RX ADMIN — HYDRALAZINE HYDROCHLORIDE 25 MG: 25 TABLET ORAL at 05:37

## 2025-06-14 RX ADMIN — INSULIN GLARGINE 16 UNITS: 100 INJECTION, SOLUTION SUBCUTANEOUS at 08:00

## 2025-06-14 RX ADMIN — GABAPENTIN 400 MG: 300 CAPSULE ORAL at 08:00

## 2025-06-14 RX ADMIN — ASPIRIN 81 MG: 81 TABLET, COATED ORAL at 08:00

## 2025-06-14 RX ADMIN — HYDRALAZINE HYDROCHLORIDE 25 MG: 25 TABLET ORAL at 14:10

## 2025-06-14 RX ADMIN — METOPROLOL SUCCINATE 100 MG: 50 TABLET, EXTENDED RELEASE ORAL at 08:00

## 2025-06-14 RX ADMIN — SODIUM CHLORIDE, PRESERVATIVE FREE 10 ML: 5 INJECTION INTRAVENOUS at 08:07

## 2025-06-14 RX ADMIN — AMLODIPINE BESYLATE 10 MG: 5 TABLET ORAL at 08:00

## 2025-06-14 RX ADMIN — HEPARIN SODIUM 5000 UNITS: 5000 INJECTION INTRAVENOUS; SUBCUTANEOUS at 05:36

## 2025-06-14 RX ADMIN — HYDRALAZINE HYDROCHLORIDE 5 MG: 20 INJECTION INTRAMUSCULAR; INTRAVENOUS at 03:57

## 2025-06-14 RX ADMIN — INSULIN LISPRO 3 UNITS: 100 INJECTION, SOLUTION INTRAVENOUS; SUBCUTANEOUS at 12:06

## 2025-06-14 RX ADMIN — OSELTAMIVIR PHOSPHATE 30 MG: 6 POWDER, FOR SUSPENSION ORAL at 08:00

## 2025-06-14 NOTE — CARE COORDINATION
Transition of Care Plan:    RUR: 10%-\"Low Risk\"  Prior Level of Functioning: Independent in his ADLs and IADLs  Disposition: Home with follow-up appts  SILVA: 6/14/25  If SNF or IPR: Date FOC offered: N/A  Follow up appointments: PCP & Specialists as indicated  DME needed: N/A  Transportation at discharge: The patient's wife is at bedside and will transport him home  IM/IMM Medicare/ letter given: 2nd IM given & signed today, 6/14/25  Is patient a  and connected with [facility]? N/A   If yes, was [facility] transfer form completed and [facility] notified?   Caregiver Contact: Ericka Almazan, Wife, Phone: [phone]  Discharge Caregiver contacted prior to discharge? CM spoke to the patient's wife at bedside  Care Conference needed? No  Barriers to discharge: N/A, patient is being discharged today     CM was alerted that the patient is being discharged today, 6/14/25. The patient has no questions/concerns for discharge and his wife is at bedside and will be transporting him home. 2nd IM letter was given and signed today, 6/14/25.     From CM perspective, the patient can be discharged.     Zelda Bosch, [facility]  [phone]

## 2025-06-14 NOTE — DISCHARGE SUMMARY
Discharge Summary    Name: Syed Almazan  [id number]  YOB: 1960 (Age: 65 y.o.)   Date of Admission: 6/11/2025  Date of Discharge: 6/14/2025  Attending Physician: Dr. Mendel    Discharge Diagnosis:     Flu infection  Sepsis from flu infection  Lactic acidosis -   ?GPC bacteremia   Hypertensive urgency -   Essential hypertension  SANDRO on CKD stage III  Hyponatremia  Severe hypophosphatemia  Severe hypomagnesemia  DM2  HTN  CKD      Consultations:  None      Brief Admission History/Reason for Admission Per Saw Cleveland MD:     Syed Almazan is a 65 y.o.  male with PMHx significant for HTN, DM2, CKD who comes in with complaints of fevers, body aches and shortness of breath.  Patient reports that he has been experiencing intermittent chills and bodyaches along with fever and shortness of breath since last night.  Patient reports that he was on an [employer] cruise recently, has just came back from it.  Does report there might have been sick contacts with flu at that time.  Patient endorses body aches, chills, subjective fevers.  Also endorses chest pain associated with coughing.  We were asked to admit for work up and evaluation of the above problems.     Brief Hospital Course by Main Problems:     Flu infection  Sepsis from flu infection  Lactic acidosis - improved  ?GPC bacteremia  - Symptoms of fevers, chills, shortness of breath  - Tachycardic 109, tachypneic in 20s, hypertensive 200s systolic  - Respiratory panel positive for flu influenza A  -CXR negative for acute pathology  - CBC negative for leukocytosis  - Troponin 44  - CMP shows hyponatremia, elevated creatinine  -Continue trending troponins  - Supportive care DuoNebs, stopped IV fluids  -Pulm hygiene, Tamiflu   Lactate normalized  - blood cx 6/12 growing coag neg staph AND alpha strep in 1/4 bottles, Rothia dentocariosa in pairs in 2nd bottle.  - repeat cx negative for 5 days at this point.   - received dose of  activity as tolerated  Wound care: None      Follow up with:   PCP : Eliot Cha Jr., MD [place], Charles K Jr., MD  [facility] [address] [phone]    Schedule an appointment as soon as possible for a visit in 1 week(s)  For a PCP hospital follow up.          Total time in minutes spent coordinating this discharge (includes going over instructions, follow-up, prescriptions, and preparing report for sign off to her PCP) :  35 minutes    David L Mendel, MD

## 2025-06-14 NOTE — PROGRESS NOTES
Hospitalist Progress Note    NAME:   Syed Almazan   : 1960   MRN: [id number]     Date/Time: 2025 8:21 PM  Patient PCP: Eliot Cha Jr., MD    Estimated discharge date: 6/15  Barriers: blood cx clearance      Assessment / Plan:    Flu infection  Sepsis from flu infection  Lactic acidosis - improved  ?GPC bacteremia  - Symptoms of fevers, chills, shortness of breath  - Tachycardic 109, tachypneic in 20s, hypertensive 200s systolic  - Respiratory panel positive for flu influenza A  -CXR negative for acute pathology  - CBC negative for leukocytosis  - Troponin 44  - CMP shows hyponatremia, elevated creatinine  -Continue trending troponins  - Supportive care DuoNebs, stopped IV fluids  -Pulm hygiene, Tamiflu   Lactate normalized  - blood cx  growing coag neg staph AND strep in  bottles  - repeat cx ordered. If repeats positive, needs echo  - added Cefazolin.      Hypertensive urgency - improved  Essential hypertension  - BP POA in the 200s  - Has not taken his medications this morning  -- Repeat BP improved to the 150s  -Resume home medications, hydralazine as needed 160 SBP     SANDRO on CKD stage III  Hyponatremia  Severe hypophosphatemia  Severe hypomagnesemia  -Born with single kidney  - POA creatinine 2.35 with a baseline 1.38 back in   -UA neg for infection. renal ultrasound - normal solitary L kidney, no hydro  --monitor electrolytes, replace as needed, avoid nephrotoxins  - Cr improving  - repleted K and mag      DM2  HTN  CKD  -C/W home medication  - Start Lantus insulin, sliding scale     Code Status: Full  DVT Prophylaxis: Lovenox  Baseline: Independent      Medical Decision Making:    [x] High (any 2)     A. Problems (any 1)  [x] Acute/Chronic Illness/injury posing threat to life or bodily function:  sepsis, possible GPC bacteremia  [] Severe exacerbation of chronic illness:    ---------------------------------------------------------------------  B. Risk of Treatment  Mendel, MD

## 2025-06-14 NOTE — PLAN OF CARE
Problem: Chronic Conditions and Co-morbidities  Goal: Patient's chronic conditions and co-morbidity symptoms are monitored and maintained or improved  6/13/2025 2251 by Grazyna Gibson RN  Outcome: Progressing  Flowsheets (Taken 6/13/2025 1915)  Care Plan - Patient's Chronic Conditions and Co-Morbidity Symptoms are Monitored and Maintained or Improved: Monitor and assess patient's chronic conditions and comorbid symptoms for stability, deterioration, or improvement  6/13/2025 1209 by Rubi Garcia RN  Outcome: Progressing     Problem: Discharge Planning  Goal: Discharge to home or other facility with appropriate resources  6/13/2025 2251 by Grazyna Gibson RN  Outcome: Progressing  Flowsheets (Taken 6/13/2025 1915)  Discharge to home or other facility with appropriate resources: Identify barriers to discharge with patient and caregiver  6/13/2025 1209 by Rubi Garcia RN  Outcome: Progressing     Problem: Pain  Goal: Verbalizes/displays adequate comfort level or baseline comfort level  6/13/2025 2251 by Grazyna Gibson RN  Outcome: Progressing  Flowsheets (Taken 6/13/2025 1915)  Verbalizes/displays adequate comfort level or baseline comfort level:   Encourage patient to monitor pain and request assistance   Assess pain using appropriate pain scale   Administer analgesics based on type and severity of pain and evaluate response   Implement non-pharmacological measures as appropriate and evaluate response   Consider cultural and social influences on pain and pain management   Notify Licensed Independent Practitioner if interventions unsuccessful or patient reports new pain  6/13/2025 1209 by Rubi Garcia RN  Outcome: Progressing     Problem: Safety - Adult  Goal: Free from fall injury  6/13/2025 2251 by Grazyna Gibson RN  Outcome: Progressing  6/13/2025 1209 by Rubi Garcia RN  Outcome: Progressing

## 2025-06-14 NOTE — PROGRESS NOTES
evidenced by -Tachycardic 109, tachypneic in 20s, source   influenza    Query created by: Harish Davis on 6/13/2025 8:38 AM      QUERY TEXT:    Hyponatremia is documented in the medical record IM?Progress Notes by Mendel, David L, MD on 6/12 with sodium 134, 134, 135. Please provide additional   clinical indicators supportive of your documentation. Or please document if   the diagnosis of hyponatremia has been ruled out after study.    The clinical indicators include:  This 65 y.o. male who presents to the ED for evaluation of acute intermittent   chills, generalized bodyaches, fevers, chest pain and shortness of breath   since last night.    -Flu Infection    -Hyponatremia, CMP shows hyponatremia, elevated creatinine (H&P 6/11, Saw Cleveland MD)    -CMP shows hyponatremia, elevated creatinine (IM PN 6/12, Mendel, David L, MD)    -SANDRO on CKD stage III, Hyponatremia (IM PN 6/12, Mendel, David L, MD)    -Sodium: 134, 134, 135 (6/11 to 6/12)    -sodium chloride 0.9 % bolus 1,000 mL, Serial lab monitoring    Thank You  Harish Davis [facility] CDS  Options provided:  -- Hyponatremia present as evidenced by, Please document evidence.  -- Hyponatremia was ruled out after study  -- Other - I will add my own diagnosis  -- Disagree - Not applicable / Not valid  -- Disagree - Clinically unable to determine / Unknown  -- Refer to Clinical Documentation Reviewer    PROVIDER RESPONSE TEXT:    Hyponatremia is present as evidenced by 134, low in our system    Query created by: Harish Davis on 6/13/2025 8:40 AM      Electronically signed by:  David L Mendel MD 6/14/2025 7:23 AM

## 2025-06-14 NOTE — PLAN OF CARE
Problem: Respiratory - Adult  Goal: Achieves optimal ventilation and oxygenation  6/14/2025 0731 by Rubi Garcia RN  Outcome: Progressing  6/13/2025 2251 by Grazyna Gibson RN  Outcome: Progressing  Flowsheets (Taken 6/13/2025 1915)  Achieves optimal ventilation and oxygenation:   Assess for changes in respiratory status   Assess for changes in mentation and behavior   Position to facilitate oxygenation and minimize respiratory effort   Oxygen supplementation based on oxygen saturation or arterial blood gases     Problem: Chronic Conditions and Co-morbidities  Goal: Patient's chronic conditions and co-morbidity symptoms are monitored and maintained or improved  6/14/2025 0731 by Rubi Garcia RN  Outcome: Progressing  6/13/2025 2251 by Grazyna Gibson RN  Outcome: Progressing  Flowsheets (Taken 6/13/2025 1915)  Care Plan - Patient's Chronic Conditions and Co-Morbidity Symptoms are Monitored and Maintained or Improved: Monitor and assess patient's chronic conditions and comorbid symptoms for stability, deterioration, or improvement     Problem: Discharge Planning  Goal: Discharge to home or other facility with appropriate resources  6/14/2025 0731 by Rubi Garcia RN  Outcome: Progressing  6/13/2025 2251 by Grazyna Gibson RN  Outcome: Progressing  Flowsheets (Taken 6/13/2025 1915)  Discharge to home or other facility with appropriate resources: Identify barriers to discharge with patient and caregiver     Problem: Pain  Goal: Verbalizes/displays adequate comfort level or baseline comfort level  6/14/2025 0731 by Rubi Garcia RN  Outcome: Progressing  6/13/2025 2251 by Grazyna Gibson RN  Outcome: Progressing  Flowsheets (Taken 6/13/2025 1915)  Verbalizes/displays adequate comfort level or baseline comfort level:   Encourage patient to monitor pain and request assistance   Assess pain using appropriate pain scale   Administer analgesics based on type and severity of pain and evaluate response   Implement

## 2025-06-14 NOTE — DISCHARGE INSTRUCTIONS
You were treated for the flu and an injury to your kidneys. Please take the medications as listed and followup with your primary care doctor.           Thank you for choosing our hospital for your care.  It is our privilege to care for you in your time of need.  In the next several days, you may receive a survey via email or mailed to your home about your experience with our team.  We would greatly appreciate you taking a few minutes to complete the survey, as we use this information to learn what we have done well and what we could be doing better. Thank you for trusting us with your care!    Here's some more information for you to know:  Please take all of your medications as prescribed. If prescribed any medications, please read all pharmacy instructions and inserts. Inform your doctor and pharmacist about all other medications and alternative therapies.  Please follow up with your PCP in 1-2 weeks to be reassessed after your hospital stay.  If you start feeling any symptoms similar to what brought you into the hospital, please come back to the ED to be re-evaluated. Come back to the ED if you feel fevers, chills, nausea and vomiting, difficulty breathing.    Again, THANK YOU for choosing us to care for YOU!

## 2025-06-15 LAB
BACTERIA SPEC CULT: ABNORMAL
BACTERIA SPEC CULT: NORMAL
SERVICE CMNT-IMP: ABNORMAL
SERVICE CMNT-IMP: NORMAL

## 2025-06-16 LAB
BACTERIA SPEC CULT: ABNORMAL
SERVICE CMNT-IMP: ABNORMAL

## 2025-06-17 LAB
BACTERIA SPEC CULT: NORMAL
SERVICE CMNT-IMP: NORMAL

## 2025-06-18 LAB
BACTERIA SPEC CULT: NORMAL
BACTERIA SPEC CULT: NORMAL
SERVICE CMNT-IMP: NORMAL
SERVICE CMNT-IMP: NORMAL

## 2025-07-19 LAB
EKG ATRIAL RATE: 111 BPM
EKG DIAGNOSIS: NORMAL
EKG P AXIS: 59 DEGREES
EKG P-R INTERVAL: 194 MS
EKG Q-T INTERVAL: 348 MS
EKG QRS DURATION: 100 MS
EKG QTC CALCULATION (BAZETT): 473 MS
EKG R AXIS: 121 DEGREES
EKG T AXIS: 35 DEGREES
EKG VENTRICULAR RATE: 111 BPM

## (undated) DEVICE — PROVE COVER: Brand: UNBRANDED

## (undated) DEVICE — SPECIAL PROCEDURE DRAPE 32" X 34": Brand: SPECIAL PROCEDURE DRAPE

## (undated) DEVICE — KIT HND CTRL 3 W STPCOCK ROT END 54IN PREM HI PRSS TBNG AT

## (undated) DEVICE — SPLINT WR VELC FOAM NEUT POS DISP FOR RAD ART ACC SFT STRP

## (undated) DEVICE — FORCEPS BX L240CM JAW DIA2.8MM L CAP W/ NDL MIC MESH TOOTH

## (undated) DEVICE — PADPRO DEFIBRILLATION/PACING/CARDIOVERSION/MONITORING ELECTRODES, ADULT/CHILD GREATER THAN 10 KG RADIOTRANSPARENT ELECTRODE, PHYSIO-CONTROL QUIK-COMBO (M) 60" (152 CM): Brand: PADPRO

## (undated) DEVICE — GUIDEWIRE VASC L260CM DIA0.035IN TIP L3MM STD EXCHG PTFE J

## (undated) DEVICE — KIT MED IMAG CNTRST AGNT W/ IOPAMIDOL REUSE

## (undated) DEVICE — WASTE KIT - ST MARY: Brand: MEDLINE INDUSTRIES, INC.

## (undated) DEVICE — KIT MFLD ISOLATN NACL CNTRST PRT TBNG SPIK W/ PRSS TRNSDUC

## (undated) DEVICE — TUBING HYDR IRR --

## (undated) DEVICE — KIT AT-X65 ANGIOTOUCH HAND CONTROLLER

## (undated) DEVICE — TR BAND RADIAL ARTERY COMPRESSION DEVICE: Brand: TR BAND

## (undated) DEVICE — CATHETER DIAG 5FR L100CM LUMN ID0.047IN JL3.5 CRV 0 SIDE H

## (undated) DEVICE — ANGIOGRAPHY KIT

## (undated) DEVICE — GLIDESHEATH SLENDER ACCESS KIT: Brand: GLIDESHEATH SLENDER

## (undated) DEVICE — Device

## (undated) DEVICE — CATHETER DIAG 5FR L100CM LUMN ID0.047IN JR4 CRV 0 SIDE H